# Patient Record
Sex: FEMALE | Race: WHITE | HISPANIC OR LATINO | URBAN - METROPOLITAN AREA
[De-identification: names, ages, dates, MRNs, and addresses within clinical notes are randomized per-mention and may not be internally consistent; named-entity substitution may affect disease eponyms.]

---

## 2017-12-10 ENCOUNTER — EMERGENCY (EMERGENCY)
Facility: HOSPITAL | Age: 67
LOS: 1 days | Discharge: HOME | End: 2017-12-10
Attending: EMERGENCY MEDICINE | Admitting: EMERGENCY MEDICINE
Payer: MEDICARE

## 2017-12-10 VITALS
DIASTOLIC BLOOD PRESSURE: 78 MMHG | TEMPERATURE: 99 F | HEART RATE: 73 BPM | SYSTOLIC BLOOD PRESSURE: 135 MMHG | OXYGEN SATURATION: 97 % | RESPIRATION RATE: 18 BRPM

## 2017-12-10 VITALS
SYSTOLIC BLOOD PRESSURE: 140 MMHG | OXYGEN SATURATION: 99 % | RESPIRATION RATE: 16 BRPM | DIASTOLIC BLOOD PRESSURE: 84 MMHG | HEART RATE: 62 BPM

## 2017-12-10 PROCEDURE — 70486 CT MAXILLOFACIAL W/O DYE: CPT

## 2017-12-10 PROCEDURE — 90715 TDAP VACCINE 7 YRS/> IM: CPT

## 2017-12-10 PROCEDURE — 99284 EMERGENCY DEPT VISIT MOD MDM: CPT | Mod: 25

## 2017-12-10 PROCEDURE — 12011 RPR F/E/E/N/L/M 2.5 CM/<: CPT

## 2017-12-10 PROCEDURE — 73562 X-RAY EXAM OF KNEE 3: CPT

## 2017-12-10 PROCEDURE — 90471 IMMUNIZATION ADMIN: CPT

## 2017-12-10 PROCEDURE — 70450 CT HEAD/BRAIN W/O DYE: CPT

## 2017-12-10 PROCEDURE — 70450 CT HEAD/BRAIN W/O DYE: CPT | Mod: 26

## 2017-12-10 PROCEDURE — 70486 CT MAXILLOFACIAL W/O DYE: CPT | Mod: 26

## 2017-12-10 PROCEDURE — 73562 X-RAY EXAM OF KNEE 3: CPT | Mod: 26,LT

## 2017-12-10 RX ORDER — CEPHALEXIN 500 MG
500 CAPSULE ORAL ONCE
Qty: 0 | Refills: 0 | Status: COMPLETED | OUTPATIENT
Start: 2017-12-10 | End: 2017-12-10

## 2017-12-10 RX ORDER — TETANUS TOXOID, REDUCED DIPHTHERIA TOXOID AND ACELLULAR PERTUSSIS VACCINE, ADSORBED 5; 2.5; 8; 8; 2.5 [IU]/.5ML; [IU]/.5ML; UG/.5ML; UG/.5ML; UG/.5ML
0.5 SUSPENSION INTRAMUSCULAR ONCE
Qty: 0 | Refills: 0 | Status: COMPLETED | OUTPATIENT
Start: 2017-12-10 | End: 2017-12-10

## 2017-12-10 RX ORDER — ACETAMINOPHEN 500 MG
650 TABLET ORAL ONCE
Qty: 0 | Refills: 0 | Status: COMPLETED | OUTPATIENT
Start: 2017-12-10 | End: 2017-12-10

## 2017-12-10 RX ORDER — CEPHALEXIN 500 MG
1 CAPSULE ORAL
Qty: 9 | Refills: 0
Start: 2017-12-10 | End: 2017-12-12

## 2017-12-10 RX ADMIN — Medication 650 MILLIGRAM(S): at 19:23

## 2017-12-10 RX ADMIN — TETANUS TOXOID, REDUCED DIPHTHERIA TOXOID AND ACELLULAR PERTUSSIS VACCINE, ADSORBED 0.5 MILLILITER(S): 5; 2.5; 8; 8; 2.5 SUSPENSION INTRAMUSCULAR at 16:26

## 2017-12-10 RX ADMIN — Medication 500 MILLIGRAM(S): at 19:23

## 2017-12-10 RX ADMIN — Medication 650 MILLIGRAM(S): at 16:23

## 2017-12-10 NOTE — ED PROVIDER NOTE - MOUTH
inner lower lip laceration - stellate 1, outer lower lip laceration  stellate,crossing the vermilion border

## 2017-12-10 NOTE — ED PROVIDER NOTE - OBJECTIVE STATEMENT
66 yo female accompanied by  in room 6 here for evaluation of facial injuries s/ trip and fall on black ice this am.  Pt states "I slipped outside this am and landed on my face. I cut my lip inside and out and broke my front tooth. My bite also seems off. My left knee is bruised". Tdap not utd. DEnies loc, denies neck and back pain.

## 2017-12-10 NOTE — ED PROCEDURE NOTE - ATTENDING CONTRIBUTION TO CARE
***Pawel Cullen MD*** signed out to Dr. Carroll upon shift change: Attending physician was available for the key components of the procedure, the patient tolerated well. There were no complications with the procedure.

## 2017-12-10 NOTE — ED PROVIDER NOTE - ATTENDING CONTRIBUTION TO CARE
Patient with trip and fall slipping on ice this am. Moderate pain to face secondary to fall. Patient cup her lower lip and felt as if she chipped her front tooth. Mild left knee pain from the fall as well without twisting or difficulty ambulating. No loss of consciousness or other head injury, no neck pain.   no midline cervical tenderness to palpation, CN 2-12 intact, normal coordination, normal finger to nose, normal heel to shin, no pronator drift, no gait instability, 5/5 strength in upper and lower extremities, normal sensory throughout, alert and oriented to person, place, time, and situation.   mild distress secondary to pain, stellate inner lower lip laceration ~3 cm, 2cm external lip laceration, trachea midline, cooperative, with capacity and insight, alert, perrl, eomi, left knee with mild tenderness to palpation, no laxity of joint  concern for facial fracture and will ct head/face, will suture wounds, offer TDAP, analgesia, and xray knee  xray knee within normal limits   no ich, no facial fractures, patient to  follow up with dental and informed of ct findings of molar with edema ? infection, will give antibiotics and discharge to pmd/dental  No immediate life threatening issues present on history or clinical exam. Patient is a safe disposition home, has capacity and insight into their condition, ambulatory in the emergency department and will follow up with their doctor(s) this week. Patient  understands anticipatory guidance and was given strict return and follow up precautions. The patient has been informed of all concerning signs and symptoms to return to Emergency Department, the necessity to follow up with PMD/Clinic/follow up provided within 2-3 days was explained, and the patient reports understanding of above with capacity and insight if patient disposition is to be home.

## 2017-12-10 NOTE — ED PROVIDER NOTE - CARE PLAN
Principal Discharge DX:	Lip laceration, initial encounter  Instructions for follow-up, activity and diet:	Return to the ER for any concerns  Return in 5- 6 days for suture removal  Eat soft foods- do not use straws  Take keflex 3x a day for 3 days  eat yogurt daily while taking antibiotic  Take  motrin for pain as needed 600 mg every8 hrs- take with food

## 2017-12-10 NOTE — ED ADULT NURSE NOTE - OBJECTIVE STATEMENT
Pt c/o trip and fall on ice today around 1230, fell forwards, blocking fall with L arm and hand, hitting mouth and teeth.  Lac to L side of lip and interior lip, front teeth chipped, pain to area where lacerations are present and radiates to sides of her face.  Denies loc.  Also c/o L hand and elbow pain, no lacs/ecchymosis/deformities to those areas, +full ROM.  Has not taken anything for pain.  Denies n/v, headache, vision changes, other symptoms.

## 2017-12-10 NOTE — ED PROVIDER NOTE - PLAN OF CARE
Return to the ER for any concerns  Return in 5- 6 days for suture removal  Eat soft foods- do not use straws  Take keflex 3x a day for 3 days  eat yogurt daily while taking antibiotic  Take  motrin for pain as needed 600 mg every8 hrs- take with food

## 2017-12-21 ENCOUNTER — TRANSCRIPTION ENCOUNTER (OUTPATIENT)
Age: 67
End: 2017-12-21

## 2019-05-13 ENCOUNTER — EMERGENCY (EMERGENCY)
Facility: HOSPITAL | Age: 69
LOS: 1 days | Discharge: HOME | End: 2019-05-13
Attending: EMERGENCY MEDICINE
Payer: MEDICARE

## 2019-05-13 VITALS
SYSTOLIC BLOOD PRESSURE: 113 MMHG | OXYGEN SATURATION: 100 % | RESPIRATION RATE: 18 BRPM | HEART RATE: 64 BPM | DIASTOLIC BLOOD PRESSURE: 78 MMHG

## 2019-05-13 VITALS
DIASTOLIC BLOOD PRESSURE: 73 MMHG | SYSTOLIC BLOOD PRESSURE: 149 MMHG | OXYGEN SATURATION: 99 % | HEIGHT: 63 IN | HEART RATE: 65 BPM | TEMPERATURE: 98 F | WEIGHT: 164.91 LBS | RESPIRATION RATE: 19 BRPM

## 2019-05-13 LAB
ALBUMIN SERPL ELPH-MCNC: 4 G/DL — SIGNIFICANT CHANGE UP (ref 3.3–5)
ALP SERPL-CCNC: 70 U/L — SIGNIFICANT CHANGE UP (ref 40–120)
ALT FLD-CCNC: 25 U/L — SIGNIFICANT CHANGE UP (ref 10–45)
ANION GAP SERPL CALC-SCNC: 13 MMOL/L — SIGNIFICANT CHANGE UP (ref 5–17)
APTT BLD: 31.3 SEC — SIGNIFICANT CHANGE UP (ref 27.5–36.3)
AST SERPL-CCNC: 27 U/L — SIGNIFICANT CHANGE UP (ref 10–40)
BASOPHILS # BLD AUTO: 0 K/UL — SIGNIFICANT CHANGE UP (ref 0–0.2)
BASOPHILS NFR BLD AUTO: 0.5 % — SIGNIFICANT CHANGE UP (ref 0–2)
BILIRUB SERPL-MCNC: 0.4 MG/DL — SIGNIFICANT CHANGE UP (ref 0.2–1.2)
BUN SERPL-MCNC: 14 MG/DL — SIGNIFICANT CHANGE UP (ref 7–23)
CALCIUM SERPL-MCNC: 10.2 MG/DL — SIGNIFICANT CHANGE UP (ref 8.4–10.5)
CHLORIDE SERPL-SCNC: 98 MMOL/L — SIGNIFICANT CHANGE UP (ref 96–108)
CO2 SERPL-SCNC: 23 MMOL/L — SIGNIFICANT CHANGE UP (ref 22–31)
CREAT SERPL-MCNC: 0.79 MG/DL — SIGNIFICANT CHANGE UP (ref 0.5–1.3)
EOSINOPHIL # BLD AUTO: 0.2 K/UL — SIGNIFICANT CHANGE UP (ref 0–0.5)
EOSINOPHIL NFR BLD AUTO: 2.5 % — SIGNIFICANT CHANGE UP (ref 0–6)
GLUCOSE SERPL-MCNC: 99 MG/DL — SIGNIFICANT CHANGE UP (ref 70–99)
HCT VFR BLD CALC: 40.6 % — SIGNIFICANT CHANGE UP (ref 34.5–45)
HGB BLD-MCNC: 14.3 G/DL — SIGNIFICANT CHANGE UP (ref 11.5–15.5)
INR BLD: 1.04 RATIO — SIGNIFICANT CHANGE UP (ref 0.88–1.16)
LIDOCAIN IGE QN: 46 U/L — SIGNIFICANT CHANGE UP (ref 7–60)
LYMPHOCYTES # BLD AUTO: 2.2 K/UL — SIGNIFICANT CHANGE UP (ref 1–3.3)
LYMPHOCYTES # BLD AUTO: 31.4 % — SIGNIFICANT CHANGE UP (ref 13–44)
MCHC RBC-ENTMCNC: 31.2 PG — SIGNIFICANT CHANGE UP (ref 27–34)
MCHC RBC-ENTMCNC: 35.2 GM/DL — SIGNIFICANT CHANGE UP (ref 32–36)
MCV RBC AUTO: 88.6 FL — SIGNIFICANT CHANGE UP (ref 80–100)
MONOCYTES # BLD AUTO: 0.5 K/UL — SIGNIFICANT CHANGE UP (ref 0–0.9)
MONOCYTES NFR BLD AUTO: 7 % — SIGNIFICANT CHANGE UP (ref 2–14)
NEUTROPHILS # BLD AUTO: 4.1 K/UL — SIGNIFICANT CHANGE UP (ref 1.8–7.4)
NEUTROPHILS NFR BLD AUTO: 58.7 % — SIGNIFICANT CHANGE UP (ref 43–77)
PLATELET # BLD AUTO: 213 K/UL — SIGNIFICANT CHANGE UP (ref 150–400)
POTASSIUM SERPL-MCNC: 3.9 MMOL/L — SIGNIFICANT CHANGE UP (ref 3.5–5.3)
POTASSIUM SERPL-SCNC: 3.9 MMOL/L — SIGNIFICANT CHANGE UP (ref 3.5–5.3)
PROT SERPL-MCNC: 6.9 G/DL — SIGNIFICANT CHANGE UP (ref 6–8.3)
PROTHROM AB SERPL-ACNC: 11.9 SEC — SIGNIFICANT CHANGE UP (ref 10–12.9)
RBC # BLD: 4.58 M/UL — SIGNIFICANT CHANGE UP (ref 3.8–5.2)
RBC # FLD: 12.3 % — SIGNIFICANT CHANGE UP (ref 10.3–14.5)
SODIUM SERPL-SCNC: 134 MMOL/L — LOW (ref 135–145)
TROPONIN T, HIGH SENSITIVITY RESULT: <6 NG/L — SIGNIFICANT CHANGE UP (ref 0–51)
WBC # BLD: 7 K/UL — SIGNIFICANT CHANGE UP (ref 3.8–10.5)
WBC # FLD AUTO: 7 K/UL — SIGNIFICANT CHANGE UP (ref 3.8–10.5)

## 2019-05-13 PROCEDURE — 99284 EMERGENCY DEPT VISIT MOD MDM: CPT | Mod: 25

## 2019-05-13 PROCEDURE — 96374 THER/PROPH/DIAG INJ IV PUSH: CPT

## 2019-05-13 PROCEDURE — 85027 COMPLETE CBC AUTOMATED: CPT

## 2019-05-13 PROCEDURE — 84484 ASSAY OF TROPONIN QUANT: CPT

## 2019-05-13 PROCEDURE — 71046 X-RAY EXAM CHEST 2 VIEWS: CPT | Mod: 26

## 2019-05-13 PROCEDURE — 85610 PROTHROMBIN TIME: CPT

## 2019-05-13 PROCEDURE — 83690 ASSAY OF LIPASE: CPT

## 2019-05-13 PROCEDURE — 85730 THROMBOPLASTIN TIME PARTIAL: CPT

## 2019-05-13 PROCEDURE — 71046 X-RAY EXAM CHEST 2 VIEWS: CPT

## 2019-05-13 PROCEDURE — 99284 EMERGENCY DEPT VISIT MOD MDM: CPT | Mod: GC

## 2019-05-13 PROCEDURE — 80053 COMPREHEN METABOLIC PANEL: CPT

## 2019-05-13 RX ORDER — FAMOTIDINE 10 MG/ML
20 INJECTION INTRAVENOUS ONCE
Refills: 0 | Status: COMPLETED | OUTPATIENT
Start: 2019-05-13 | End: 2019-05-13

## 2019-05-13 RX ADMIN — FAMOTIDINE 20 MILLIGRAM(S): 10 INJECTION INTRAVENOUS at 12:58

## 2019-05-13 RX ADMIN — Medication 30 MILLILITER(S): at 12:58

## 2019-05-13 NOTE — ED PROVIDER NOTE - ATTENDING CONTRIBUTION TO CARE
MD Carly -- I have reviewed this note, the presenting symptoms, and the Chief Complaint and the History of Present Illness as documented, with the other care provider(s) and nurses on the patient care team. I have also reviewed this patient's past medical/surgical history and social/family history as reviewed and listed in this electronic medical record and agree with the above except as noted below:     HPI -- 67 y F h/o HTN, remote h/o wpw s/p ablation 2000 being eval'd by cardiology for palps over the last few weeks (external home monitor), unknown event hx on tele; presents today because has 2 days sternal CP, band like across epigastrium, unsure if radiates, believes possibly to back.  Intermittent, episodic, not a/w exertion, no n/v associated.  Had episode of lightheadness/diaphoresis earlier this AM that was not a/w CP, which made her present for eval.  Nonsmoker, denies toxic habits, no cough, no sob, no f/c, no complaints otherwise.  States sister had a stroke approx 10 days ago (2/2 afib) and she is concerned re her own risk for afib; this has been causing her significant stress and anxiety.  Currently not on AC.  PMH -- wpw, Hypertension  PSH -- ablation for wpw 2000  Allergies -- Contrast dye (CT) -- anaphylaxis  ROS -- CP, diaphoresis  PE -- GENERAL: AAOx4, GCS 15, NAD but tearful, WDWN; HEENT: MMM, no jugular venous distension, supple neck, PERRLA, EOMI, nonicteric sclera; PULM: CTA B, no crackles/rubs/rales; CV: RRR, S1S2, no MRG; ABD: Flat abdomen, NTND, no R/G/R, no CVAT.  MSK: GAO, +2 pulses x4;  NEURO: No obvious focal deficits; PSYCH: AAOx3, clear thought and normal sensorium.   MDM -- Palps, CP, diaphoresis earlier today; on oupt tele for palps and feeling "fluttering" over last few days; CP/diaphoresis is more acute (yesterday into today).  Symptoms are band-like across epigastrium, minimal currently, no n/v, SOB.  Feels anxious, no exertional component.  Last stress approx 1 yr ago, echo 3 mon ago -- both WNL.  B/L UE pressures WNL, symptoms are not c/w PE or dissection.  Overall low suspicion for acute coronary syndrome but requires trops/CXR/EKG.  Will reassess after w/u.  Consider CDU v d/c assuming benign w/u.

## 2019-05-13 NOTE — ED PROVIDER NOTE - NSFOLLOWUPINSTRUCTIONS_ED_ALL_ED_FT
Follow up with Dr. Gomez's office at your appointment at 3 PM on WEDNESDAY.  Call the office to reschedule if you cannot make this appointment  Continue with your medications as prescribed  Return to the ER IMMEDIATELY for continued chest pain, vomiting, dizziness/lightheadedness, difficulty breathing, or ANY other concerns

## 2019-05-13 NOTE — ED PROVIDER NOTE - PROGRESS NOTE DETAILS
Carly -- s/w Dr. Gomez, patient's cardiologist.  Comfortable c d/c plan as stated, will be able to see patient on Wed at 3pm.  Long d/w pt re return precautions; patient is comfortable c d/c and feels better.  D/C.  --BMM

## 2019-05-13 NOTE — ED ADULT NURSE NOTE - OBJECTIVE STATEMENT
68 y/o female hx HTN, WPW s/p ablation presents to the ED from home c/o palpitations x few days. Pt states she feels palpitations periodically, nothing exacerbates or makes pain better, concerned due to pt sister recent stroke hx due to afib. Pain is "band like" radiates to back. Denies fever, chills, n/v, weakness, abd pain, diarrhea/constipation, numbness/tingling, urinary s/s, SOB, recent travel. Pt A&Ox3, in no respiratory distress, no CP, PT safety maintained with family at bedside, call bell within reach and bed in the lowest position.

## 2019-05-13 NOTE — ED ADULT NURSE NOTE - NSIMPLEMENTINTERV_GEN_ALL_ED
Implemented All Universal Safety Interventions:  [place] to call system. Call bell, personal items and telephone within reach. Instruct patient to call for assistance. Room bathroom lighting operational. Non-slip footwear when patient is off stretcher. Physically safe environment: no spills, clutter or unnecessary equipment. Stretcher in lowest position, wheels locked, appropriate side rails in place.

## 2019-05-14 PROBLEM — F32.9 MAJOR DEPRESSIVE DISORDER, SINGLE EPISODE, UNSPECIFIED: Chronic | Status: ACTIVE | Noted: 2017-12-10

## 2019-05-14 PROBLEM — I10 ESSENTIAL (PRIMARY) HYPERTENSION: Chronic | Status: ACTIVE | Noted: 2017-12-10

## 2019-05-14 PROBLEM — K21.9 GASTRO-ESOPHAGEAL REFLUX DISEASE WITHOUT ESOPHAGITIS: Chronic | Status: ACTIVE | Noted: 2017-12-10

## 2020-02-26 ENCOUNTER — APPOINTMENT (OUTPATIENT)
Dept: ELECTROPHYSIOLOGY | Facility: CLINIC | Age: 70
End: 2020-02-26
Payer: MEDICARE

## 2020-02-26 ENCOUNTER — TRANSCRIPTION ENCOUNTER (OUTPATIENT)
Age: 70
End: 2020-02-26

## 2020-02-26 VITALS
HEART RATE: 65 BPM | SYSTOLIC BLOOD PRESSURE: 178 MMHG | BODY MASS INDEX: 26.91 KG/M2 | HEIGHT: 62.5 IN | OXYGEN SATURATION: 97 % | DIASTOLIC BLOOD PRESSURE: 84 MMHG | WEIGHT: 150 LBS | RESPIRATION RATE: 17 BRPM

## 2020-02-26 DIAGNOSIS — I45.6 PRE-EXCITATION SYNDROME: ICD-10-CM

## 2020-02-26 DIAGNOSIS — Z72.89 OTHER PROBLEMS RELATED TO LIFESTYLE: ICD-10-CM

## 2020-02-26 DIAGNOSIS — Z83.438 FAMILY HISTORY OF OTHER DISORDER OF LIPOPROTEIN METABOLISM AND OTHER LIPIDEMIA: ICD-10-CM

## 2020-02-26 DIAGNOSIS — I10 ESSENTIAL (PRIMARY) HYPERTENSION: ICD-10-CM

## 2020-02-26 DIAGNOSIS — Z82.49 FAMILY HISTORY OF ISCHEMIC HEART DISEASE AND OTHER DISEASES OF THE CIRCULATORY SYSTEM: ICD-10-CM

## 2020-02-26 DIAGNOSIS — R00.2 PALPITATIONS: ICD-10-CM

## 2020-02-26 DIAGNOSIS — Z78.9 OTHER SPECIFIED HEALTH STATUS: ICD-10-CM

## 2020-02-26 PROCEDURE — 99204 OFFICE O/P NEW MOD 45 MIN: CPT

## 2020-02-26 RX ORDER — APIXABAN 5 MG/1
5 TABLET, FILM COATED ORAL
Qty: 180 | Refills: 3 | Status: ACTIVE | COMMUNITY

## 2020-02-26 RX ORDER — OMEGA-3-ACID ETHYL ESTERS 1 G/1
1 CAPSULE, LIQUID FILLED ORAL TWICE DAILY
Qty: 180 | Refills: 3 | Status: ACTIVE | COMMUNITY

## 2020-02-26 RX ORDER — CYCLOSPORINE 0.5 MG/ML
0.05 EMULSION OPHTHALMIC
Refills: 0 | Status: ACTIVE | COMMUNITY

## 2020-02-26 RX ORDER — VITAMIN E (DL,TOCOPHERYL ACET) 180 MG
500 CAPSULE ORAL
Refills: 0 | Status: ACTIVE | COMMUNITY

## 2020-02-26 NOTE — REVIEW OF SYSTEMS
[Recent Weight Loss (___ Lbs)] : recent [unfilled] ~Ulb weight loss [Eyeglasses] : currently wearing eyeglasses [see HPI] : see HPI [Palpitations] : palpitations [Negative] : Heme/Lymph

## 2020-02-26 NOTE — REASON FOR VISIT
[Initial Evaluation] : an initial evaluation of [Spouse] : spouse [Atrial Fibrillation] : atrial fibrillation

## 2020-02-26 NOTE — PHYSICAL EXAM
[General Appearance - In No Acute Distress] : no acute distress [General Appearance - Well Developed] : well developed [Normal Conjunctiva] : the conjunctiva exhibited no abnormalities [No Oral Cyanosis] : no oral cyanosis [Eyelids - No Xanthelasma] : the eyelids demonstrated no xanthelasmas [No Oral Pallor] : no oral pallor [Normal Jugular Venous V Waves Present] : normal jugular venous V waves present [Heart Rate And Rhythm] : heart rate and rhythm were normal [Heart Sounds] : normal S1 and S2 [Murmurs] : no murmurs present [Arterial Pulses Normal] : the arterial pulses were normal [Respiration, Rhythm And Depth] : normal respiratory rhythm and effort [Edema] : no peripheral edema present [Auscultation Breath Sounds / Voice Sounds] : lungs were clear to auscultation bilaterally [Abdomen Soft] : soft [Abdomen Tenderness] : non-tender [Gait - Sufficient For Exercise Testing] : the gait was sufficient for exercise testing [Abnormal Walk] : normal gait [Nail Clubbing] : no clubbing of the fingernails [] : no rash [Cyanosis, Localized] : no localized cyanosis [No Anxiety] : not feeling anxious

## 2020-02-27 RX ORDER — DRONEDARONE 400 MG/1
400 TABLET, FILM COATED ORAL
Qty: 180 | Refills: 3 | Status: DISCONTINUED | COMMUNITY
End: 2020-02-27

## 2020-03-04 ENCOUNTER — NON-APPOINTMENT (OUTPATIENT)
Age: 70
End: 2020-03-04

## 2020-03-04 ENCOUNTER — APPOINTMENT (OUTPATIENT)
Dept: ELECTROPHYSIOLOGY | Facility: CLINIC | Age: 70
End: 2020-03-04
Payer: MEDICARE

## 2020-03-04 VITALS
OXYGEN SATURATION: 98 % | BODY MASS INDEX: 26.58 KG/M2 | DIASTOLIC BLOOD PRESSURE: 78 MMHG | HEART RATE: 59 BPM | HEIGHT: 63 IN | SYSTOLIC BLOOD PRESSURE: 130 MMHG | WEIGHT: 150 LBS

## 2020-03-04 DIAGNOSIS — Z86.79 PERSONAL HISTORY OF OTHER DISEASES OF THE CIRCULATORY SYSTEM: ICD-10-CM

## 2020-03-04 DIAGNOSIS — E78.00 PURE HYPERCHOLESTEROLEMIA, UNSPECIFIED: ICD-10-CM

## 2020-03-04 PROCEDURE — 99213 OFFICE O/P EST LOW 20 MIN: CPT

## 2020-03-04 NOTE — HISTORY OF PRESENT ILLNESS
[FreeTextEntry1] : Patient is a 69-year-old woman who is known to me in the past who is seen today in evaluation for paroxysmal atrial fibrillation.  She was accompanied by her .\par \par In terms of her past medical history we had done a WPW ablation on her May 30, 1996 at [facility] in [facility].  At that time she had had delta waves there were consistent with a left sided pathway.  The pathway was mapped to the left lateral position and was successfully ablated.  Since then she has not had any recurrence of the SVT.\par \par She has a history of hypertension for which she is treated with Toprol- mg/day she is also on hydrochlorothiazide 25 mg/day.\par \par There is no prior history of diabetes, TIA or prior stroke.  She has no known prior thyroid disease.\par \par No previous history of CAD or prior myocardial infarction or heart failure.\par \par The patient does have a prior history of sleep apnea and was given a mask but she slept worse with a mask and was returned.  Of note she has lost weight since that sleep study.\par \par A. fib history: About 2 years ago the patient had nonspecific complaints of feeling fluttering or palpitations and rapid beats.  She subsequently had a monitor when was the monitor she had a Holter monitor 24 hours performed April 2019 that showed sinus rhythm, APCs, PVCs, short episodes of rate controlled A. fib.  She then had a 2-week event monitor.  Did not have copy of all of the event monitor strips but according to the patient there was one episode of A. fib during event monitoring.  A strip from May 18 2-0 19 at 2 AM had showed atrial flutter with variable block.  Between April 2019 and February of this year she continue to have frequent episodes that were short in duration.  These episodes were short in duration to the point where she could not catch them on her Apple Watch.  She continued to have episodes initially lasting very short duration less than 5 minutes but then became longer as much is 30 minutes.  The episodes occur randomly without any clear triggers.  I reviewed some of the episodes on her record is from the apple watch clearly shows A. fib with rates on average between . \par \par The patient was previously started on Eliquis because of her high chadsvasc score based on female gender, age greater than 65 and history of hypertension.  After her episodes became more frequent and last longer she was also started on Multaq 400 mg twice daily.  Of note she was already on Toprol- mg/day.  According to the patient the Multaq has not really worked and may have made some episodes even worse.\par \par She had additional testing which included an echocardiogram that showed estimated ejection fraction 56%, left atrial diameter 4.1 cm mild left ventricular hypertrophy mild left atrial enlargement mild mild mitral and tricuspid regurg mild pulmonary hypertension with estimated pressure 41 mmHg.  She had a stress echo.  Currently the stress test showed she had no evidence of ischemia but she had exercise-induced rapid A. fib.\par \par Family history: Her mother has permanent atrial fibrillation.\par \par The patient was initially treated with Toprol more for history of hypertension.

## 2020-03-04 NOTE — ADDENDUM
[FreeTextEntry1] : I, Allie Kimball, acted solely as a scribe for Dr. Quinonez on this date 02/26/2020.

## 2020-03-04 NOTE — END OF VISIT
[FreeTextEntry3] : All medical records entries made by the Scribe were at my, Dr. Vikram Quinonez MD, direction and personally dictated by me on 02/26/2020. I have personally reviewed the chart and agree that the record accurately reflects my personal performance of the history, physical exam, assessment, and plan. I have also personally directed, reviewed, and agreed with the chart.\par \par

## 2020-03-04 NOTE — DISCUSSION/SUMMARY
[FreeTextEntry1] : Patient will discontinue her Multaq as it is not helping her A fib at this time. Her A fib could be age related or brought on by sleep apnea. Patients mother did have A fib as well. She will begin on the flecainide two days after she stops her Multaq. She will remain on metoprolol at this time. \par \par She will follow up with me on whether or not she wants to continue with medication therapy or if she wants to go ahead with an ablation. Patient has been made aware of all risks concerning the ablation procedure. Patient will follow up after consultation with her .

## 2020-03-17 PROBLEM — E78.00 HYPERCHOLESTEROLEMIA: Status: ACTIVE | Noted: 2020-02-26

## 2020-03-17 NOTE — DISCUSSION/SUMMARY
[FreeTextEntry1] : The patient seems to be doing well on flecainide.  We will continue her on  Flecainide 100mg in the morning and 50mg at night.  We will continue anticoagulation for now.  Her blood pressure is controlled.  She will continue monitoring through her Apple Watch.  If recurrent episodes of atrial fibrillation we will consider catheter ablation.  Patient does not want to have the procedure at this time.  \par \par Follow-up evaluation in 4 months.

## 2020-03-17 NOTE — PHYSICAL EXAM
[General Appearance - Well Developed] : well developed [General Appearance - In No Acute Distress] : no acute distress [Normal Conjunctiva] : the conjunctiva exhibited no abnormalities [Eyelids - No Xanthelasma] : the eyelids demonstrated no xanthelasmas [No Oral Pallor] : no oral pallor [No Oral Cyanosis] : no oral cyanosis [Normal Jugular Venous V Waves Present] : normal jugular venous V waves present [Respiration, Rhythm And Depth] : normal respiratory rhythm and effort [Auscultation Breath Sounds / Voice Sounds] : lungs were clear to auscultation bilaterally [Heart Rate And Rhythm] : heart rate and rhythm were normal [Heart Sounds] : normal S1 and S2 [Murmurs] : no murmurs present [Arterial Pulses Normal] : the arterial pulses were normal [Edema] : no peripheral edema present [Abdomen Soft] : soft [Abdomen Tenderness] : non-tender [Nail Clubbing] : no clubbing of the fingernails [Cyanosis, Localized] : no localized cyanosis [] : no rash [Impaired Insight] : insight and judgment were intact

## 2020-03-17 NOTE — ADDENDUM
[FreeTextEntry1] : I, Allie Kimball, acted solely as a scribe for Dr. Quinonez on this date 03/04/2020.

## 2020-03-17 NOTE — END OF VISIT
[FreeTextEntry3] : All medical records entries made by the Scribe were at my, Dr. Vikram Quinonez MD, direction and personally dictated by me on 03/04/2020. I have personally reviewed the chart and agree that the record accurately reflects my personal performance of the history, physical exam, assessment, and plan. I have also personally directed, reviewed, and agreed with the chart.\par \par

## 2020-03-17 NOTE — HISTORY OF PRESENT ILLNESS
[FreeTextEntry1] : She is seen in follow-up regarding her atrial fibrillation.  On the last visit she was started on low-dose flecainide.  This dosage was increased to 100 mg twice daily.  Understands that she is feeling well with very infrequent very short palpitation.  She has no other symptoms.\par \par She has a history of hypertension for which she is treated with Toprol- mg/day she is also on hydrochlorothiazide 25 mg/day.\par \par There is no prior history of diabetes, TIA or prior stroke.  She has no known prior thyroid disease.\par \par No previous history of CAD or prior myocardial infarction or heart failure.\par \par The patient does have a prior history of sleep apnea and was given a mask but she slept worse with a mask and was returned.  Of note she has lost weight since that sleep study.\par \par The patient was previously started on Eliquis because of her high chadsvasc score based on female gender, age greater than 65 and history of hypertension. \par \par Echocardiogram showed estimated ejection fraction 56%, left atrial diameter 4.1 cm mild left ventricular hypertrophy mild left atrial enlargement mild mild mitral and tricuspid regurg mild pulmonary hypertension with estimated pressure 41 mmHg. Stress echo showed no evidence of ischemia \par Family history: Her mother has permanent atrial fibrillation.\par \par

## 2020-09-28 ENCOUNTER — NON-APPOINTMENT (OUTPATIENT)
Age: 70
End: 2020-09-28

## 2020-09-28 ENCOUNTER — APPOINTMENT (OUTPATIENT)
Dept: ELECTROPHYSIOLOGY | Facility: CLINIC | Age: 70
End: 2020-09-28
Payer: MEDICARE

## 2020-09-28 VITALS
DIASTOLIC BLOOD PRESSURE: 84 MMHG | OXYGEN SATURATION: 96 % | SYSTOLIC BLOOD PRESSURE: 140 MMHG | WEIGHT: 150 LBS | BODY MASS INDEX: 26.58 KG/M2 | HEART RATE: 54 BPM | HEIGHT: 63 IN | TEMPERATURE: 97.8 F

## 2020-09-28 PROCEDURE — 93000 ELECTROCARDIOGRAM COMPLETE: CPT

## 2020-09-28 PROCEDURE — 99213 OFFICE O/P EST LOW 20 MIN: CPT

## 2021-07-05 ENCOUNTER — APPOINTMENT (OUTPATIENT)
Dept: MRI IMAGING | Facility: CLINIC | Age: 71
End: 2021-07-05

## 2021-07-06 ENCOUNTER — OUTPATIENT (OUTPATIENT)
Dept: OUTPATIENT SERVICES | Facility: HOSPITAL | Age: 71
LOS: 1 days | End: 2021-07-06
Payer: MEDICARE

## 2021-07-06 ENCOUNTER — APPOINTMENT (OUTPATIENT)
Dept: MRI IMAGING | Facility: IMAGING CENTER | Age: 71
End: 2021-07-06
Payer: MEDICARE

## 2021-07-06 DIAGNOSIS — Z00.8 ENCOUNTER FOR OTHER GENERAL EXAMINATION: ICD-10-CM

## 2021-07-06 PROCEDURE — 70551 MRI BRAIN STEM W/O DYE: CPT | Mod: 26,MH

## 2021-07-06 PROCEDURE — 70551 MRI BRAIN STEM W/O DYE: CPT

## 2021-07-07 ENCOUNTER — APPOINTMENT (OUTPATIENT)
Dept: MRI IMAGING | Facility: CLINIC | Age: 71
End: 2021-07-07

## 2021-07-14 ENCOUNTER — APPOINTMENT (OUTPATIENT)
Dept: ELECTROPHYSIOLOGY | Facility: CLINIC | Age: 71
End: 2021-07-14
Payer: MEDICARE

## 2021-07-14 ENCOUNTER — NON-APPOINTMENT (OUTPATIENT)
Age: 71
End: 2021-07-14

## 2021-07-14 VITALS
WEIGHT: 177 LBS | BODY MASS INDEX: 31.36 KG/M2 | HEIGHT: 63 IN | TEMPERATURE: 97.3 F | SYSTOLIC BLOOD PRESSURE: 160 MMHG | HEART RATE: 57 BPM | DIASTOLIC BLOOD PRESSURE: 88 MMHG | OXYGEN SATURATION: 100 %

## 2021-07-14 PROCEDURE — 93000 ELECTROCARDIOGRAM COMPLETE: CPT

## 2021-07-14 PROCEDURE — 99214 OFFICE O/P EST MOD 30 MIN: CPT

## 2021-07-14 NOTE — DISCUSSION/SUMMARY
[FreeTextEntry1] : Patient is doing well overall without any recurrence of A. fib.  She has infrequent PVCs and we discussed the options and the decision was to continue on medical therapy and if the burden should become higher or more symptomatic we would then we discussed catheter ablation.\par We will continue her on flecainide 100 mg twice daily which is considered a low average dose.  We discussed Eliquis and because of her prior A. fib I would recommend that we continue.  In future we may consider an implantable loop monitor and and may decide to hold Eliquis if we have the alerts for A. fib.  We will continue her on the current dose of Toprol- mg a day as well.\par \par The patient will follow-up with her primary care physician as well as her cardiologist and will see me in follow-up in approximately a year.  If she were to have symptoms before then then I would see her based on symptoms.\par The patient seems to be doing well on flecainide.  We will continue her on  Flecainide 100mg in the morning and 50mg at night.  We will continue anticoagulation for now.  Her blood pressure is controlled.  She will continue monitoring through her Apple Watch.  If recurrent episodes of atrial fibrillation we will consider catheter ablation.  Patient does not want to have the procedure at this time.  \par \par Follow-up evaluation in 4 months.

## 2021-07-14 NOTE — REVIEW OF SYSTEMS
[Feeling Fatigued] : feeling fatigued [Dizziness] : dizziness [Under Stress] : under stress [Fever] : no fever [Blurry Vision] : no blurred vision [Sore Throat] : no sore throat [Dyspnea on exertion] : not dyspnea during exertion [Chest Discomfort] : no chest discomfort [Cough] : no cough [Abdominal Pain] : no abdominal pain [Dysuria] : no dysuria [Easy Bleeding] : no tendency for easy bleeding

## 2021-07-14 NOTE — HISTORY OF PRESENT ILLNESS
[FreeTextEntry1] : The patient is a 71-year-old woman who is seen in follow-up evaluation for atrial fibrillation.  She has been on flecainide 100 mg twice daily and Toprol- mg a day.  In addition the patient is on a diuretic hydrochlorothiazide 25 mg/day.\par \par She denies having any recurrence of atrial fibrillation but at times through her apple watch she feels her heart rate dips down to 40.  She is not able to check her heart rates at night because she prefer not to wear to watch at night.  She has had symptoms of vertigo at times.  She also gets symptoms of dizziness and lightheadedness but no chest pain or shortness of breath.  She has a cold sensation in her chest at times.\par \par She does not do much activities.\par \par The patient seen neurologist and had a few test done including MRI and EEG an VEG.\par \par She is seen in follow-up regarding her atrial fibrillation.  On the last visit she was started on low-dose flecainide.  This dosage was increased to 100 mg twice daily.  Understands that she is feeling well with very infrequent very short palpitation.  She has no other symptoms.\par \par She has a history of hypertension for which she is treated with Toprol- mg/day she is also on hydrochlorothiazide 25 mg/day.\par \par There is no prior history of diabetes, TIA or prior stroke.  She has no known prior thyroid disease.\par \par No previous history of CAD or prior myocardial infarction or heart failure.\par \par The patient does have a prior history of sleep apnea and was given a mask but she slept worse with a mask and was returned.  Of note she has lost weight since that sleep study.\par \par The patient was previously started on Eliquis because of her high chadsvasc score based on female gender, age greater than 65 and history of hypertension. \par \par Echocardiogram showed estimated ejection fraction 56%, left atrial diameter 4.1 cm mild left ventricular hypertrophy mild left atrial enlargement mild mild mitral and tricuspid regurg mild pulmonary hypertension with estimated pressure 41 mmHg. Stress echo showed no evidence of ischemia \par Family history: Her mother has permanent atrial fibrillation.\par \par

## 2021-07-14 NOTE — CARDIOLOGY SUMMARY
[de-identified] : Sinus  Bradycardia 58 bpm\par Left atrial enlargement.\par \par  ms\par \par QRS duration 90 ms\par \par QT interval 420 ms\par

## 2021-07-14 NOTE — HISTORY OF PRESENT ILLNESS
[FreeTextEntry1] : \par \par Patient is a 70-year woman who is seen in follow-up evaluation today.  She was previously seen by me March 4, 2020 at which time she was doing well.  Since then she has had a stress test that was done on June 17, 2020 she exercise achieved 94% of her predicted heart rate and no exercise at this arrhythmia was noted.  She has not had any recurrence of A. fib.  She has had isolated PVCs which she feels and sometimes bothersome but the frequency is low.  She is seen in follow-up regarding her atrial fibrillation.  On the last visit she was started on low-dose flecainide.  This dosage was increased to 100 mg twice daily.  Understands that she is feeling well with very infrequent very short palpitation.  She has no other symptoms.\par \par She has a history of hypertension for which she is treated with Toprol- mg/day she is also on hydrochlorothiazide 25 mg/day.\par \par There is no prior history of diabetes, TIA or prior stroke.  She has no known prior thyroid disease.\par \par No previous history of CAD or prior myocardial infarction or heart failure.\par \par The patient does have a prior history of sleep apnea and was given a mask but she slept worse with a mask and was returned.  Of note she has lost weight since that sleep study.\par \par The patient was previously started on Eliquis because of her high chadsvasc score based on female gender, age greater than 65 and history of hypertension. \par \par Echocardiogram showed estimated ejection fraction 56%, left atrial diameter 4.1 cm mild left ventricular hypertrophy mild left atrial enlargement mild mild mitral and tricuspid regurg mild pulmonary hypertension with estimated pressure 41 mmHg. Stress echo showed no evidence of ischemia \par Family history: Her mother has permanent atrial fibrillation.\par \par

## 2021-07-14 NOTE — DISCUSSION/SUMMARY
[FreeTextEntry1] : Patient has had some symptoms of dizziness and lightheadedness and question whether her vertigo is heart rate related as well.  Her blood pressure still elevated at least in the office at 160/88.\par \par My recommendation would be to decrease the dose of her beta-blocker given her symptoms of dizziness lightheadedness and bradycardia.  She is currently on Toprol- mg a day and would recommend decreasing to  50 mg/day.  She will need better control of her blood pressure and I would recommend an ACE inhibitor.\par \par We will also recommend more exertional activities diet and weight loss.  Even though she is using apple watch we might consider recommending an implantable loop monitor if there are difficulties with her rhythm analysis.\par \par I have discussed her blood pressure situation with the patient.  She seems to think that because of her acute stress recently with her mother her blood pressure is high she would prefer not to take any additional blood pressure medications point.  The patient will monitor this and then we will just read discuss over the next month.  She is to follow-up with a neurologist regarding her other symptoms.\par \par I would like her to follow-up in 4 months to reassess her.\par \par Follow-up evaluation in 4 months.

## 2021-07-14 NOTE — REASON FOR VISIT
[Hypertension] : hypertension [Atrial Fibrillation] : atrial fibrillation [Spouse] : spouse [FreeTextEntry2] : Follow up

## 2021-07-28 ENCOUNTER — EMERGENCY (EMERGENCY)
Facility: HOSPITAL | Age: 71
LOS: 1 days | Discharge: HOME | End: 2021-07-28
Attending: EMERGENCY MEDICINE
Payer: MEDICARE

## 2021-07-28 VITALS
RESPIRATION RATE: 20 BRPM | HEART RATE: 168 BPM | WEIGHT: 175.93 LBS | OXYGEN SATURATION: 100 % | SYSTOLIC BLOOD PRESSURE: 136 MMHG | TEMPERATURE: 98 F | HEIGHT: 63 IN | DIASTOLIC BLOOD PRESSURE: 77 MMHG

## 2021-07-28 DIAGNOSIS — Z90.710 ACQUIRED ABSENCE OF BOTH CERVIX AND UTERUS: Chronic | ICD-10-CM

## 2021-07-28 LAB
ALBUMIN SERPL ELPH-MCNC: 4.3 G/DL — SIGNIFICANT CHANGE UP (ref 3.3–5)
ALP SERPL-CCNC: 93 U/L — SIGNIFICANT CHANGE UP (ref 40–120)
ALT FLD-CCNC: 27 U/L — SIGNIFICANT CHANGE UP (ref 10–45)
ANION GAP SERPL CALC-SCNC: 16 MMOL/L — SIGNIFICANT CHANGE UP (ref 5–17)
AST SERPL-CCNC: 31 U/L — SIGNIFICANT CHANGE UP (ref 10–40)
BASOPHILS # BLD AUTO: 0.05 K/UL — SIGNIFICANT CHANGE UP (ref 0–0.2)
BASOPHILS NFR BLD AUTO: 0.6 % — SIGNIFICANT CHANGE UP (ref 0–2)
BILIRUB SERPL-MCNC: 0.3 MG/DL — SIGNIFICANT CHANGE UP (ref 0.2–1.2)
BUN SERPL-MCNC: 19 MG/DL — SIGNIFICANT CHANGE UP (ref 7–23)
CALCIUM SERPL-MCNC: 10.2 MG/DL — SIGNIFICANT CHANGE UP (ref 8.4–10.5)
CHLORIDE SERPL-SCNC: 99 MMOL/L — SIGNIFICANT CHANGE UP (ref 96–108)
CK MB CFR SERPL CALC: 3.2 NG/ML — SIGNIFICANT CHANGE UP (ref 0–3.8)
CO2 SERPL-SCNC: 21 MMOL/L — LOW (ref 22–31)
CREAT SERPL-MCNC: 0.96 MG/DL — SIGNIFICANT CHANGE UP (ref 0.5–1.3)
EOSINOPHIL # BLD AUTO: 0.18 K/UL — SIGNIFICANT CHANGE UP (ref 0–0.5)
EOSINOPHIL NFR BLD AUTO: 2.2 % — SIGNIFICANT CHANGE UP (ref 0–6)
GLUCOSE SERPL-MCNC: 163 MG/DL — HIGH (ref 70–99)
HCT VFR BLD CALC: 46.2 % — HIGH (ref 34.5–45)
HGB BLD-MCNC: 14.7 G/DL — SIGNIFICANT CHANGE UP (ref 11.5–15.5)
IMM GRANULOCYTES NFR BLD AUTO: 0.2 % — SIGNIFICANT CHANGE UP (ref 0–1.5)
LYMPHOCYTES # BLD AUTO: 2.62 K/UL — SIGNIFICANT CHANGE UP (ref 1–3.3)
LYMPHOCYTES # BLD AUTO: 31.8 % — SIGNIFICANT CHANGE UP (ref 13–44)
MAGNESIUM SERPL-MCNC: 1.9 MG/DL — SIGNIFICANT CHANGE UP (ref 1.6–2.6)
MCHC RBC-ENTMCNC: 28.9 PG — SIGNIFICANT CHANGE UP (ref 27–34)
MCHC RBC-ENTMCNC: 31.8 GM/DL — LOW (ref 32–36)
MCV RBC AUTO: 90.9 FL — SIGNIFICANT CHANGE UP (ref 80–100)
MONOCYTES # BLD AUTO: 0.58 K/UL — SIGNIFICANT CHANGE UP (ref 0–0.9)
MONOCYTES NFR BLD AUTO: 7 % — SIGNIFICANT CHANGE UP (ref 2–14)
NEUTROPHILS # BLD AUTO: 4.8 K/UL — SIGNIFICANT CHANGE UP (ref 1.8–7.4)
NEUTROPHILS NFR BLD AUTO: 58.2 % — SIGNIFICANT CHANGE UP (ref 43–77)
NRBC # BLD: 0 /100 WBCS — SIGNIFICANT CHANGE UP (ref 0–0)
PHOSPHATE SERPL-MCNC: 2.6 MG/DL — SIGNIFICANT CHANGE UP (ref 2.5–4.5)
PLATELET # BLD AUTO: 231 K/UL — SIGNIFICANT CHANGE UP (ref 150–400)
POTASSIUM SERPL-MCNC: 3.6 MMOL/L — SIGNIFICANT CHANGE UP (ref 3.5–5.3)
POTASSIUM SERPL-SCNC: 3.6 MMOL/L — SIGNIFICANT CHANGE UP (ref 3.5–5.3)
PROT SERPL-MCNC: 7.6 G/DL — SIGNIFICANT CHANGE UP (ref 6–8.3)
RBC # BLD: 5.08 M/UL — SIGNIFICANT CHANGE UP (ref 3.8–5.2)
RBC # FLD: 13.2 % — SIGNIFICANT CHANGE UP (ref 10.3–14.5)
SODIUM SERPL-SCNC: 136 MMOL/L — SIGNIFICANT CHANGE UP (ref 135–145)
TROPONIN T, HIGH SENSITIVITY RESULT: 40 NG/L — SIGNIFICANT CHANGE UP (ref 0–51)
WBC # BLD: 8.25 K/UL — SIGNIFICANT CHANGE UP (ref 3.8–10.5)
WBC # FLD AUTO: 8.25 K/UL — SIGNIFICANT CHANGE UP (ref 3.8–10.5)

## 2021-07-28 PROCEDURE — 99220: CPT | Mod: GC

## 2021-07-28 PROCEDURE — 71045 X-RAY EXAM CHEST 1 VIEW: CPT | Mod: 26

## 2021-07-28 RX ORDER — METOPROLOL TARTRATE 50 MG
10 TABLET ORAL ONCE
Refills: 0 | Status: COMPLETED | OUTPATIENT
Start: 2021-07-28 | End: 2021-07-28

## 2021-07-28 RX ORDER — RANITIDINE HYDROCHLORIDE 150 MG/1
0 TABLET, FILM COATED ORAL
Qty: 0 | Refills: 0 | DISCHARGE

## 2021-07-28 RX ADMIN — Medication 10 MILLIGRAM(S): at 19:50

## 2021-07-28 NOTE — ED CDU PROVIDER INITIAL DAY NOTE - PHYSICAL EXAMINATION
GEN: Well Appearing, Nontoxic, NAD  HEENT: NC/AT, Symm Facies.   CV: No JVD/Bruits or stridor;  +S1S2, RRR w/o m/g/r  RESP: CTAB w/o w/r/r  ABD: Soft, nt/nd  EXT/MSK: No lower extremity edema or calf tenderness. FROMx4  PSYCH: Appropriate mood and affect   Neuro: Grossly intact, AOX3 with normal speech, steady gait

## 2021-07-28 NOTE — ED PROVIDER NOTE - OBJECTIVE STATEMENT
72yo F Hx of HTN, depression WPW s/p ablation 1993, afib diagnosed 2 years ago presenting with complaints of palpitations. reports that she occasionally goes in and out of afib and it usually breaks on its own. this afternoon started having palpitations with intermittent sob and noted with her pulse ox that her HR  was going as fast as 200. palpitations persisted prompting pt to come to the ED. takes metoprolol 100mg which she took this AM and is also on flecainamide. takes eliquis but has missed the past 2 doses because she has a dental appt on friday. denies any lightheadedness, dizziness, chest pain, abd pain, nausea or vomiting. EP is Dr. Quinonez.

## 2021-07-28 NOTE — ED CDU PROVIDER DISPOSITION NOTE - CLINICAL COURSE
72 y/o F with PMH WPW s/p remote ablation, Afib on Eliquis and Flecainide, GERD, Depression, HTN presents to ED with irregular heart beat starting today. States that after eating dinner she felt her heart pounding and on her apple watch saw her heart rate was in the 190s-230s bpm. Symptoms associated with SOB. States that she called EP and was sent to voicemail. Patient has been taking Eliquis for Afib, has been holding it as per her cardiologist. States that she has a appointment with her dentist and was told 3 days prior to stop taking her Eliquis prior to any dental work. Took all of her medications for today. Denies current chest pain, SOB, fever, cough, visual changes, vomiting, abdominal pain.  	EP: Vikram Quinonez [phone]  	Cardiology: Jason [phone]  ED course: HR 216bpm, Afib with RVR, given IV Metoprolol, heart rate decreased to the 70s NSR. Plan for tele monitoring and EP consult in CDU. 70 y/o F with PMH WPW s/p remote ablation, Afib on Eliquis and Flecainide, GERD, Depression, HTN presents to ED with irregular heart beat starting today. States that after eating dinner she felt her heart pounding and on her apple watch saw her heart rate was in the 190s-230s bpm. Symptoms associated with SOB. States that she called EP and was sent to voicemail. Patient has been taking Eliquis for Afib, has been holding it as per her cardiologist. States that she has a appointment with her dentist and was told 3 days prior to stop taking her Eliquis prior to any dental work. Took all of her medications for today. Denies current chest pain, SOB, fever, cough, visual changes, vomiting, abdominal pain.  	EP: Vikram Morales [phone]  	Cardiology: Jason [phone]  ED course: HR 216bpm, Afib with RVR, given IV Metoprolol, heart rate decreased to the 70s NSR. Plan for tele monitoring and EP consult in CDU. Patient remained stable while in CDU with no events over tele. Seen by EP attending Dr. Morales who recommended to continue home medications as prescribed. Seen by Dr. Gauthier who cleared pt for discharge home

## 2021-07-28 NOTE — ED CDU PROVIDER INITIAL DAY NOTE - OBJECTIVE STATEMENT
72 y/o F with PMH WPW s/p remote ablation, Afib on Eliquis and Flecainide, GERD, Depression, HTN presents to ED with irregular heart beat starting today. States that after eating dinner she felt her heart pounding and on her apple watch saw her heart rate was in the 190s-230s bpm. Symptoms associated with SOB. States that she called EP and was sent to voicemail. Patient has been taking Eliquis for Afib, has been holding it as per her cardiologist. States that she has a appointment with her dentist and was told 3 days prior to stop taking her Eliquis prior to any dental work. Took all of her medications for today. Denies current chest pain, SOB, fever, cough, visual changes, vomiting, abdominal pain.  EP: Vikram Quinonez [phone]  Cardiology: Jason [phone]  ED course: HR 216bpm, Afib with RVR, given IV Metoprolol, heart rate decreased to the 70s NSR. Plan for tele monitoring and EP consult in CDU.

## 2021-07-28 NOTE — ED CDU PROVIDER INITIAL DAY NOTE - PRO INTERPRETER NEED 2
Querida Ava,    Nuestros registros indican que hemos intentado llamarle muchas veces sin disponibilidad. [place] es en lo que respecta a la boris que estaba programada para la programaciÃ³n de cirugÃ­a 06/04/18. La  gustarÃ­a que kishor evan boris antes de Junio con simpson socia  para programar la cirugÃ­a. Estamos preocupados porque simpson dunia es importante para nosotros. Por favor llame a nuestra oficina al [phone] para programar esta boris.    Atentamente,       Signatures   Electronically signed by : COLETTE Wise; Feb 20 2018  8:52AM CST    Electronically signed by : COLETTE Wise; Feb 20 2018  8:53AM CST    
English

## 2021-07-28 NOTE — CONSULT NOTE ADULT - ASSESSMENT
71F with history of WPW s/p ablation, pAfib on apixaban, HTN, HLD, and depression who presents with symptomatic Afib with RVR with conversion back to NSR after administration of 10 mg IV metoprolol.     Recommendations:  1. Continue to hold apixaban  2. Recommend metoprolol tartrate 25 mg q6h while in the hospital (instead of home Toprol XL)  3. Telemetry monitoring  4. Continue home flecainide 100 mg BID, buproprion  mg daily  5. Would hold home HCTZ   6. Replete K to 4 and Mg to 2  7. If rapid Afib with RVR again can trial IV metoprolol 5 mg     Please see attending addendum for final recommendations.    Alex Dugan MD  Cardiology Fellow, PGY4  Cell: [phone]    This is an overnight/weekend consult. During regular weekday hospital hours please contact appropriate day consult fellow. Contact information can be obtained on [employer] (Login: Etelos).

## 2021-07-28 NOTE — CONSULT NOTE ADULT - SUBJECTIVE AND OBJECTIVE BOX
Cardiac Electrophysiology Consult Note    Patient seen and evaluated at bedside    Chief Complaint: Palpitations    HPI:  71F with history of WPW s/p ablation, pAfib on apixaban, HTN, HLD, and depression who presented to the ED with complaint of palpitations and found to be in Afib with RVR with conversion back to NSR after administration of 10 mg IV metoprolol.     Patient follows with general cardiologist Dr. Gomze and EP Dr. Quinonez. Patient reports that she had an episode of Afib with RVR July 15th that self-resoled and Dr. Quinonez is aware. Patient believes that her Afib is being triggered by emotional stress related to issues with nursing care for her elderly mother. Other than symptomatic Afib (palpitations) patient denies CHF symptoms such as shortness of breath, LE swelling, orthopnea, and denies chest pain.     Of note, patient is currently not taking her apixaban (both doses not taken today) because she has planned dental procedure.    Per ED team patient was transferred to CDU for further monitoring. EP is consulted for recommendations.     PMHx:   HTN (hypertension)  Depression  GERD (gastroesophageal reflux disease)  Afib  WPW s/p ablation    PSHx:   History of hysterectomy    Allergies:  IV Contrast (Other)    Home Medications:  Eliquis 5 mg oral tablet: BID (28 Jul 2021 22:00)  flecainide 100 mg oral tablet: 1 tab(s) orally every 12 hours (28 Jul 2021 22:00)  hydroCHLOROthiazide 25 mg daily:  (28 Jul 2021 22:00)  Restasis:  (28 Jul 2021 22:00)  Toprol- mg daily:  (28 Jul 2021 22:00)  Wellbutrin 300 mg daily:  (28 Jul 2021 22:00)  Lovaza 2g BID    Current Medications:     FAMILY HISTORY:  No pertinent family history in first degree relatives    Social History:  Smoking History: Former smoker  Alcohol Use: Occasional   Drug Use: Denies    REVIEW OF SYSTEMS:  Constitutional:     [x ] negative [ ] fevers [ ] chills [ ] weight loss [ ] weight gain  HEENT:                  [x ] negative [ ] dry eyes [ ] eye irritation [ ] postnasal drip [ ] nasal congestion  CV:                         [ ] negative  [ ] chest pain [ ] orthopnea [x] palpitations [ ] murmur  Resp:                     [x ] negative [ ] cough [ ] shortness of breath [ ] dyspnea [ ] wheezing [ ] sputum [ ]hemoptysis  GI:                          [ x] negative [ ] nausea [ ] vomiting [ ] diarrhea [ ] constipation [ ] abd pain [ ] dysphagia   :                        [ x] negative [ ] dysuria [ ] nocturia [ ] hematuria [ ] increased urinary frequency  Musculoskeletal: [x ] negative [ ] back pain [ ] myalgias [ ] arthralgias [ ] fracture  Skin:                       [ x] negative [ ] rash [ ] itch  Neurological:        [ x] negative [ ] headache [ ] dizziness [ ] syncope [ ] weakness [ ] numbness  Psychiatric:           [ x] negative [ ] anxiety [ ] depression  Endocrine:            [ x] negative [ ] diabetes [ ] thyroid problem  Heme/Lymph:      [ x] negative [ ] anemia [ ] bleeding problem  Allergic/Immune: [ x] negative [ ] itchy eyes [ ] nasal discharge [ ] hives [ ] angioedema    [ x] All other systems negative    Physical Exam:  T(F): 98.5 (07-28), Max: 98.5 (07-28)  HR: 75 (07-28) (70 - 216)  BP: 104/63 (07-28) (104/63 - 158/97)  RR: 17 (07-28)  SpO2: 97% (07-28)  GENERAL: No acute distress, well-developed  HEAD:  Atraumatic, Normocephalic  ENT: EOMI, PERRLA, conjunctiva and sclera clear, Neck supple, No JVD, moist mucosa  CHEST/LUNG: Clear to auscultation bilaterally; No wheeze, equal breath sounds bilaterally   BACK: No spinal tenderness  HEART: Regular rate and rhythm; No murmurs, rubs, or gallops  ABDOMEN: Soft, Nontender, Nondistended; Bowel sounds present  EXTREMITIES:  No clubbing, cyanosis, or edema  PSYCH: Nl behavior, nl affect  NEUROLOGY: AAOx3, non-focal, cranial nerves intact  SKIN: Normal color, No rashes or lesions    LINES:  Peripheral IV    Cardiovascular Diagnostic Testing:    ECG: Personally reviewed:  NSR    Labs: Personally reviewed                        14.7 8.25  )-----------( 231      ( 28 Jul 2021 20:08 )             46.2     07-28    136  |  99  |  19  ----------------------------<  163<H>  3.6   |  21<L>  |  0.96    Ca    10.2      28 Jul 2021 20:08  Phos  2.6     07-28  Mg     1.9     07-28    TPro  7.6  /  Alb  4.3  /  TBili  0.3  /  DBili  x   /  AST  31  /  ALT  27  /  AlkPhos  93  07-28

## 2021-07-28 NOTE — ED CDU PROVIDER DISPOSITION NOTE - ATTENDING CONTRIBUTION TO CARE
Attending MD Gauthier:   I personally have seen and examined this patient.  Physician assistant note reviewed and agree on plan of care and except where noted.  See HPI, PE, and MDM for details.

## 2021-07-28 NOTE — ED CDU PROVIDER DISPOSITION NOTE - PATIENT PORTAL LINK FT
You can access the FollowMyHealth Patient Portal offered by [facility] by registering at the following website: [email or web address]. By joining [employer]’s FollowMyHealth portal, you will also be able to view your health information using other applications (apps) compatible with our system.

## 2021-07-28 NOTE — ED CDU PROVIDER DISPOSITION NOTE - NSFOLLOWUPINSTRUCTIONS_ED_ALL_ED_FT
Your diagnoses at this time was: Afib with RVR     Hydrate.     You may be contacted by our Emergency Department Referrals Coordinator to set up your follow up appointment within 24-48 hours of your discharge Monday- Friday. We recommend you follow up with your primary care provider and your electrophysiologist within the next 2-3 days, please bring all of your results with you.     Please return to the Emergency Department with new, worsening, or concerning symptoms, such as:  -Palpitations, fast heart rate    -Shortness of breath or trouble breathing  -Pressure, pain, tightness in chest  -Facial drooping, arm weakness, or speech difficulty   -Head injury or loss of consciousness   -Nonstop bleeding or an open wound     *More detailed information regarding your visit and discharge can be found by reviewing this packet Your diagnoses at this time was: Afib with RVR     Hydrate.     You may be contacted by our Emergency Department Referrals Coordinator to set up your follow up appointment within 24-48 hours of your discharge Monday- Friday. We recommend you follow up with your primary care provider and your electrophysiologist within the next 2-3 days, please bring all of your results with you.     Please return to the Emergency Department with new, worsening, or concerning symptoms, such as:  -Palpitations, fast heart rate    -Shortness of breath or trouble breathing  -Pressure, pain, tightness in chest  -Facial drooping, arm weakness, or speech difficulty   -Head injury or loss of consciousness   -Nonstop bleeding or an open wound     *More detailed information regarding your visit and discharge can be found by reviewing this packet    CONTINUE ALL HOME MEDICATIONS AS DIRECTED

## 2021-07-28 NOTE — ED ADULT NURSE NOTE - NSIMPLEMENTINTERV_GEN_ALL_ED
Implemented All Universal Safety Interventions:  [place] to call system. Call bell, personal items and telephone within reach. Instruct patient to call for assistance. Room bathroom lighting operational. Non-slip footwear when patient is off stretcher. Physically safe environment: no spills, clutter or unnecessary equipment. Stretcher in lowest position, wheels locked, appropriate side rails in place.

## 2021-07-28 NOTE — ED ADULT NURSE NOTE - OBJECTIVE STATEMENT
71 y old female with PMH of afib on Eliquis, GERD, depression, and HTN presents to the ED from home c/o irregular heartbeat. Pt reports "pounding heart beat" associated with SOB that began today. Pt reports she was going to the dentist today and did not take her Eliquis. Denies HA, fevers, chills, abd pain, n/v/d, weakness. Pt A&O x 4, respirations rapid and even. Peripheral pulses equal. Pt placed on cardiac monitor, in atrial fibrillation with RVR. HR 200s. MD Castro and MD Scanlon at bedside. Pt placed on pads. Pt placed on nonrebreather for comfort. Bed in lowest position, side rails up and call bell in reach.

## 2021-07-28 NOTE — ED PROVIDER NOTE - CLINICAL SUMMARY MEDICAL DECISION MAKING FREE TEXT BOX
Dr. Castro Note: rapid afib to 220 with symptoms, rate control, consider cardioversion if not amenable to rate control

## 2021-07-28 NOTE — ED CDU PROVIDER INITIAL DAY NOTE - NS ED ROS FT
Constitutional: No fever or chills  Eyes: No visual changes  CV: +chest pain no lower extremity edema  Resp:+ SOB no cough  GI: No abd pain. No nausea or vomiting. No diarrhea.   : No dysuria, hematuria.   MSK: No musculoskeletal pain  Skin: No rash  Psych: No complaints   Neuro: No headache. No numbness or tingling. No weakness.

## 2021-07-28 NOTE — ED PROVIDER NOTE - PROGRESS NOTE DETAILS
melanie hagan pgy3: spoke with house cardiology, aware of pt agrees with plan of tele monitoring overnight with EP consult in the AM. Dr. Castro Note: pt broke rapid afib easily after metoprolol 10mg IVP, back in sinus 70s.  Given pt's history, would be more comfortable observing patient overnight for recurrence of rapid afib, consult with EP on disposition plan, likely for dc in AM if pt remains stable.  Would hold a/c for now given pt likely for dc tomorrow and can still obtain dental procedure.  No indication for emergent imaging or echo at this time.

## 2021-07-28 NOTE — ED ADULT NURSE NOTE - SEPSIS REFERENCE DATA CRITERIA 1
IV placed by Danae Schaumann RN. Unable to obtain labs with draw. Shanell Hernández, lab at bedside to attempt. Unable to at this time. Will update Dr. Mica Kraft. Pt resting in bed. Resp regular. Call light in reach. Pt remains diaphoretic. IV fluids restrated.       Lissy Belcher RN  01/26/21 6190 Abormal VS: Temp > 100F or < 96.8F; SBP < 90 mmHG; HR > 120bpm; Resp > 24/min

## 2021-07-28 NOTE — ED CDU PROVIDER DISPOSITION NOTE - NS ED MD DISPO DISCHARGE
Central venous sheath removed from right neck this morning without complication  Manual pressure held X 5 minutes with hemostasis  Dressing applied  Home

## 2021-07-28 NOTE — ED CDU PROVIDER INITIAL DAY NOTE - DETAILS
Afib  -Tele  -EP consult  -Vitals every 4 hours, frequent reevaluations, LIVIA Castro  -Per Dr. Castro, hold Eliquis tonight and repeat troponin not needed, likely elevated 2/2 demand patient without chest pain at time of CDU evaluation

## 2021-07-28 NOTE — ED PROVIDER NOTE - ATTENDING CONTRIBUTION TO CARE
Pt with chest pain, palpitations, and feelings similar to previous fast irregular heart rate, pt found with -200 on apple watch, here for eval.  Pt on flecanide, metoprolol, and Eliquis, held for last day due to planned dental procedure.  Pt appears anxious, tachycardic to 220, narrow complex, BP stable 150s, clear lungs, no JVD, ab soft, nt, neuro intact.  Will give additional metoprolol IV and reassess.

## 2021-07-29 VITALS
OXYGEN SATURATION: 100 % | DIASTOLIC BLOOD PRESSURE: 57 MMHG | HEART RATE: 64 BPM | TEMPERATURE: 98 F | RESPIRATION RATE: 17 BRPM | SYSTOLIC BLOOD PRESSURE: 114 MMHG

## 2021-07-29 LAB
SARS-COV-2 RNA SPEC QL NAA+PROBE: SIGNIFICANT CHANGE UP
TSH SERPL-MCNC: 1.12 UIU/ML — SIGNIFICANT CHANGE UP (ref 0.27–4.2)

## 2021-07-29 PROCEDURE — 84484 ASSAY OF TROPONIN QUANT: CPT

## 2021-07-29 PROCEDURE — 99291 CRITICAL CARE FIRST HOUR: CPT | Mod: 25

## 2021-07-29 PROCEDURE — 82553 CREATINE MB FRACTION: CPT

## 2021-07-29 PROCEDURE — U0003: CPT

## 2021-07-29 PROCEDURE — 99217: CPT

## 2021-07-29 PROCEDURE — 96374 THER/PROPH/DIAG INJ IV PUSH: CPT

## 2021-07-29 PROCEDURE — G0378: CPT

## 2021-07-29 PROCEDURE — 83735 ASSAY OF MAGNESIUM: CPT

## 2021-07-29 PROCEDURE — 80053 COMPREHEN METABOLIC PANEL: CPT

## 2021-07-29 PROCEDURE — 93005 ELECTROCARDIOGRAM TRACING: CPT

## 2021-07-29 PROCEDURE — 85025 COMPLETE CBC W/AUTO DIFF WBC: CPT

## 2021-07-29 PROCEDURE — 71045 X-RAY EXAM CHEST 1 VIEW: CPT

## 2021-07-29 PROCEDURE — 84100 ASSAY OF PHOSPHORUS: CPT

## 2021-07-29 PROCEDURE — U0005: CPT

## 2021-07-29 PROCEDURE — 84443 ASSAY THYROID STIM HORMONE: CPT

## 2021-07-29 RX ORDER — METOPROLOL TARTRATE 50 MG
100 TABLET ORAL DAILY
Refills: 0 | Status: DISCONTINUED | OUTPATIENT
Start: 2021-07-29 | End: 2021-08-01

## 2021-07-29 RX ORDER — POTASSIUM CHLORIDE 20 MEQ
40 PACKET (EA) ORAL ONCE
Refills: 0 | Status: COMPLETED | OUTPATIENT
Start: 2021-07-29 | End: 2021-07-29

## 2021-07-29 RX ORDER — FLECAINIDE ACETATE 50 MG
100 TABLET ORAL
Refills: 0 | Status: DISCONTINUED | OUTPATIENT
Start: 2021-07-29 | End: 2021-08-01

## 2021-07-29 RX ORDER — APIXABAN 2.5 MG/1
5 TABLET, FILM COATED ORAL EVERY 12 HOURS
Refills: 0 | Status: DISCONTINUED | OUTPATIENT
Start: 2021-07-29 | End: 2021-08-01

## 2021-07-29 RX ORDER — METOPROLOL TARTRATE 50 MG
25 TABLET ORAL EVERY 6 HOURS
Refills: 0 | Status: DISCONTINUED | OUTPATIENT
Start: 2021-07-29 | End: 2021-08-01

## 2021-07-29 RX ORDER — BUPROPION HYDROCHLORIDE 150 MG/1
300 TABLET, EXTENDED RELEASE ORAL DAILY
Refills: 0 | Status: DISCONTINUED | OUTPATIENT
Start: 2021-07-29 | End: 2021-08-01

## 2021-07-29 RX ADMIN — BUPROPION HYDROCHLORIDE 300 MILLIGRAM(S): 150 TABLET, EXTENDED RELEASE ORAL at 09:45

## 2021-07-29 RX ADMIN — Medication 100 MILLIGRAM(S): at 11:29

## 2021-07-29 RX ADMIN — APIXABAN 5 MILLIGRAM(S): 2.5 TABLET, FILM COATED ORAL at 11:29

## 2021-07-29 RX ADMIN — Medication 40 MILLIEQUIVALENT(S): at 04:18

## 2021-07-29 RX ADMIN — Medication 25 MILLIGRAM(S): at 04:20

## 2021-07-29 RX ADMIN — Medication 100 MILLIGRAM(S): at 09:45

## 2021-07-29 NOTE — PROGRESS NOTE ADULT - ATTENDING COMMENTS
Patient seen and examined in ED with Dr. Aquino. Has PAF with LBBB though remote possibility of [place]. Will need another ablation but defer further plans to Dr. Quinonez

## 2021-07-29 NOTE — ED CDU PROVIDER SUBSEQUENT DAY NOTE - HISTORY
No interval changes since initial CDU provider note. Pt feels well without complaint. NAD VSS. no events on tele. Plan for continued tele monitoring and EP consult, in the setting fo Afib with RVR now resolved.Rene Dobbs

## 2021-07-29 NOTE — ED ADULT NURSE REASSESSMENT NOTE - COMFORT CARE
ambulated to bathroom/darkened lights/plan of care explained/po fluids offered/repositioned/warm blanket provided
ambulated to bathroom/po fluids offered/wait time explained

## 2021-07-29 NOTE — ED ADULT NURSE REASSESSMENT NOTE - GENERAL PATIENT STATE
comfortable appearance/cooperative/improvement verbalized/resting/sleeping/smiling/interactive
comfortable appearance

## 2021-07-29 NOTE — ED ADULT NURSE REASSESSMENT NOTE - NS ED NURSE REASSESS COMMENT FT1
11.40 Pt is evaluated by CDU MD Disha Oconnor  pt is feeling better.  Pt is discharged . Ml out  YOKASTA Tyson   explained the follow up care & gave the discharge summary  . Pt has stable vitals steady gait A&OX 4 at the time of Discharge
07.00 Am Received the Pt from  SYLVIE Estevez  . Pt is Observed for renal Colic . Received the Pt A&OX 4 obeys commands [place] N/V/D fever chills cp SOB   Comfort care & safety measures continued  IV site looks clean & dry no signs of infiltration noted pt denies  pain IV site .  Pt is advised to call for help  call bell with in the reach pt verbalized the understanding . pending CDU  MD art . GCS 15/15 A&OX 4 PERRLA  size 3 Strong upper & lower extremities steady gait   No facial droop  No Hand Leg drop denies numbness tingling  Pt is NSR 63/mt  on the Monitor .Continue to monitor
Received pt from SYLVIE Gaines, received pt alert and responsive, oriented x4, denies any respiratory distress, SOB, or difficulty breathing. Pt transferred to CDU for irregular heartrate. Pt is currently NSR hr: 70's. No complaints of chest pain, pressure, tightness, SOB, diaphoresis, dizziness, lightheadedness, NV, F/C, heart palpitations. IV in place, patent and free of signs of infiltration, V/S stable, pt afebrile, pt denies pain at this time. Pt educated on unit and unit rules, instructed patient to notify RN of any needed assistance, Pt verbalizes understanding, Call bell placed within reach. Safety maintained. Will continue to monitor.

## 2021-07-29 NOTE — ED CDU PROVIDER SUBSEQUENT DAY NOTE - PROGRESS NOTE DETAILS
Per cardiology note, recommending metoprolol tartrate 25 mg q6h and to hold HCTZ. OK to hold Eliquis. - Patience Dobbs PA-C YOKASTA Dodson: Patient seen at bedside in NAD.  VSS.  Patient resting comfortably without complaints. rate controlled 57bpm. pending EP attending recs YOKASTA Dodson: seen by Dr. Quinonez EP attending who recommended to continue Toprol XL 100mg and wants pt to get Torpol prior to discharge

## 2021-07-29 NOTE — PROGRESS NOTE ADULT - ASSESSMENT
71F with history of WPW s/p ablation, pAfib on apixaban, HTN, HLD, and depression who presents with symptomatic Afib with RVR along with aberrancy with conversion back to NSR after administration of 10 mg IV metoprolol.     Recommendations: - possible Afib with RVR and abberancy and will likely need an ablation as outpatient  1. Continue AC with eliquis 5 BID  2. Continue toprol 100mg daily  3. Continue home flecainide 100 mg BID  Please arrange follow up with Dr Quinonez upon discharge    Jackson Aquino MD  Cardiology Fellow PGY-4  [phone]  All Cardiology service information can be found 24/7 on [email or web address], password: [email or web address].

## 2021-07-29 NOTE — PROGRESS NOTE ADULT - SUBJECTIVE AND OBJECTIVE BOX
Patient seen and examined at bedside.    Overnight Events: No acute events overnight. Now in sinus rhythm with HRs 60-70s.    Current Meds:  apixaban 5 milliGRAM(s) Oral every 12 hours  buPROPion XL (24-Hour) 300 milliGRAM(s) Oral daily  flecainide 100 milliGRAM(s) Oral two times a day  metoprolol succinate  milliGRAM(s) Oral daily  metoprolol tartrate 25 milliGRAM(s) Oral every 6 hours      PAST MEDICAL & SURGICAL HISTORY:  HTN (hypertension)    Depression    GERD (gastroesophageal reflux disease)    Afib    History of hysterectomy        Vitals:  T(F): 97.7 (07-29), Max: 98.5 (07-28)  HR: 63 (07-29) (63 - 216)  BP: 105/61 (07-29) (104/51 - 158/97)  RR: 16 (07-29)  SpO2: 97% (07-29)  I&O's Summary      Physical Exam:  Appearance: No acute distress; well appearing  Eyes: PERRL, EOMI, pink conjunctiva  HENT: Normal oral mucosa  Cardiovascular: RRR, S1, S2, no murmurs, rubs, or gallops; no edema; no JVD  Respiratory: Clear to auscultation bilaterally  Gastrointestinal: soft, non-tender, non-distended with normal bowel sounds  Musculoskeletal: No clubbing; no joint deformity   Neurologic: Non-focal  Lymphatic: No lymphadenopathy  Psychiatry: AAOx3, mood & affect appropriate  Skin: No rashes, ecchymoses, or cyanosis                          14.7   8.25  )-----------( 231      ( 28 Jul 2021 20:08 )             46.2     07-28    136  |  99  |  19  ----------------------------<  163<H>  3.6   |  21<L>  |  0.96    Ca    10.2      28 Jul 2021 20:08  Phos  2.6     07-28  Mg     1.9     07-28    TPro  7.6  /  Alb  4.3  /  TBili  0.3  /  DBili  x   /  AST  31  /  ALT  27  /  AlkPhos  93  07-28      CARDIAC MARKERS ( 28 Jul 2021 20:08 )  x     / x     / x     / x     / 3.2 ng/mL              New ECG(s): Personally reviewed    Echo:    Stress Testing:     Cath:    Imaging:    Interpretation of Telemetry:

## 2021-07-29 NOTE — ED CDU PROVIDER SUBSEQUENT DAY NOTE - MEDICAL DECISION MAKING DETAILS
I have personally performed a face to face diagnostic evaluation on this patient.  I have reviewed the ACP note and agree with the history, exam, and plan of care, except as noted.  History and Exam by me shows       Pt observed overnight for telemetry monitoring and EP consultation sp rapid afib, converted sp IV lopressor x 1 yesterday. Remains in NSR overnight, no complaints but is very stressed about her mother. Pending EP consultation this morning. Disposition will be as per EP. Continue PO lopressor, flecainide. Eliquis being held for planned dental procedure this week.

## 2021-08-02 ENCOUNTER — APPOINTMENT (OUTPATIENT)
Dept: ELECTROPHYSIOLOGY | Facility: CLINIC | Age: 71
End: 2021-08-02
Payer: MEDICARE

## 2021-08-02 VITALS
TEMPERATURE: 97.7 F | DIASTOLIC BLOOD PRESSURE: 90 MMHG | HEART RATE: 52 BPM | HEIGHT: 63 IN | SYSTOLIC BLOOD PRESSURE: 170 MMHG | OXYGEN SATURATION: 97 % | WEIGHT: 175 LBS | BODY MASS INDEX: 31.01 KG/M2

## 2021-08-02 DIAGNOSIS — R00.1 BRADYCARDIA, UNSPECIFIED: ICD-10-CM

## 2021-08-02 PROBLEM — I48.91 UNSPECIFIED ATRIAL FIBRILLATION: Chronic | Status: ACTIVE | Noted: 2021-07-28

## 2021-08-02 PROCEDURE — 99213 OFFICE O/P EST LOW 20 MIN: CPT

## 2021-08-02 PROCEDURE — 93000 ELECTROCARDIOGRAM COMPLETE: CPT

## 2021-08-20 PROBLEM — R00.1 BRADYCARDIA: Status: ACTIVE | Noted: 2021-07-14

## 2021-08-20 NOTE — DISCUSSION/SUMMARY
[FreeTextEntry1] : Patient has had recurrent atrial fibrillation on flecainide daily.  She has had as well.  At baseline she is bradycardic .  Previous echo had shown EF 56% and left atrial diameter 4.1 cm.\par \par He seems to have stress testing with possible trigger.\par \par She has a family history atrial fibrillation as well.\par \par We have previously discussed risk factor patient.  She does have a prior history of sleep apnea and hypertension.  She denies any excessive caffeine or alcohol intake.\par \par She most likely need catheter ablation because of a high chance of recurrent atrial fibrillation.  She would prefer not to have the procedure currently.  She has had no evidence of ventricular preexcitation.  We will increase her flecainide to 150 mg in the morning and 100 mg p.m. dose.  Continue her beta-blocker and anticoagulation.\par \par She had prior stress test performed 2020.  This was an exercise treadmill test.  She will follow up with her cardiologist and we will recommend a nuclear stress test given the fact that she is on a type Ic drug.\par \par She understands that if she has recurrent atrial fibrillation on a higher dose of flecainide that she would consider catheter ablation procedure.  Continue on beta-blocker as well.\par \par Follow-up electrophysiology assessment in 3 to 6 months depending whether she has recurrent A. fib.

## 2021-08-20 NOTE — HISTORY OF PRESENT ILLNESS
[FreeTextEntry1] : Patient is a 71-year-old woman who is seen in follow-up evaluation for ablation.  She recently presented seen in the emergency room at [facility] requested.  She was found to be in A. fib with RVR which converted back to sinus rhythm after 10 mg IV metoprolol.  She had a prior episode of atrial fibrillation in July 15, 2021 as well.  She feels that her AF is triggered by emotional stress as it relates to issues with care of her elderly mother.  She has occasional symptoms of shortness of breath and palpitations.  She has no dizziness, lightheadedness, chest pain, syncope or presyncope.  She was previously prescribed Eliquis.  She has been on flecainide 100 mg twice daily as well as Toprol- mg daily.  In addition she has been on hydrochlorothiazide 25 mg/day.\par \par There is no prior history of diabetes, TIA or stroke.  She has had a prior history of hypertension.  CAD with previous MI.

## 2021-08-20 NOTE — PHYSICAL EXAM
[No Acute Distress] : no acute distress [Normal Conjunctiva] : normal conjunctiva [Normal Venous Pressure] : normal venous pressure [Normal S1, S2] : normal S1, S2 [No Murmur] : no murmur [No Rub] : no rub [Non Tender] : non-tender [Normal Gait] : normal gait [No Edema] : no edema [Normal] : alert and oriented, normal memory

## 2021-08-20 NOTE — REVIEW OF SYSTEMS
[Weight Gain (___ Lbs)] : no recent weight gain [Feeling Fatigued] : not feeling fatigued [Blurry Vision] : no blurred vision [Sore Throat] : no sore throat [Dyspnea on exertion] : not dyspnea during exertion [Chest Discomfort] : no chest discomfort [Syncope] : no syncope [Abdominal Pain] : no abdominal pain [Under Stress] : under stress [Easy Bleeding] : no tendency for easy bleeding

## 2021-08-31 ENCOUNTER — INPATIENT (INPATIENT)
Facility: HOSPITAL | Age: 71
LOS: 2 days | Discharge: HOME | DRG: 308 | End: 2021-09-03
Attending: INTERNAL MEDICINE | Admitting: INTERNAL MEDICINE
Payer: MEDICARE

## 2021-08-31 VITALS
HEART RATE: 71 BPM | WEIGHT: 149.91 LBS | SYSTOLIC BLOOD PRESSURE: 118 MMHG | RESPIRATION RATE: 19 BRPM | OXYGEN SATURATION: 100 % | DIASTOLIC BLOOD PRESSURE: 76 MMHG | TEMPERATURE: 99 F | HEIGHT: 63 IN

## 2021-08-31 DIAGNOSIS — Z90.710 ACQUIRED ABSENCE OF BOTH CERVIX AND UTERUS: Chronic | ICD-10-CM

## 2021-08-31 DIAGNOSIS — I48.91 UNSPECIFIED ATRIAL FIBRILLATION: ICD-10-CM

## 2021-08-31 LAB
ALBUMIN SERPL ELPH-MCNC: 4.1 G/DL — SIGNIFICANT CHANGE UP (ref 3.3–5)
ALP SERPL-CCNC: 72 U/L — SIGNIFICANT CHANGE UP (ref 40–120)
ALT FLD-CCNC: 19 U/L — SIGNIFICANT CHANGE UP (ref 10–45)
AST SERPL-CCNC: 21 U/L — SIGNIFICANT CHANGE UP (ref 10–40)
BASOPHILS # BLD AUTO: 0.02 K/UL — SIGNIFICANT CHANGE UP (ref 0–0.2)
BASOPHILS NFR BLD AUTO: 0.3 % — SIGNIFICANT CHANGE UP (ref 0–2)
BILIRUB SERPL-MCNC: 0.4 MG/DL — SIGNIFICANT CHANGE UP (ref 0.2–1.2)
BUN SERPL-MCNC: 14 MG/DL — SIGNIFICANT CHANGE UP (ref 7–23)
CALCIUM SERPL-MCNC: 9.8 MG/DL — SIGNIFICANT CHANGE UP (ref 8.4–10.5)
CHLORIDE SERPL-SCNC: 99 MMOL/L — SIGNIFICANT CHANGE UP (ref 96–108)
CO2 SERPL-SCNC: 21 MMOL/L — LOW (ref 22–31)
CREAT SERPL-MCNC: 0.88 MG/DL — SIGNIFICANT CHANGE UP (ref 0.5–1.3)
EOSINOPHIL # BLD AUTO: 0.15 K/UL — SIGNIFICANT CHANGE UP (ref 0–0.5)
EOSINOPHIL NFR BLD AUTO: 1.9 % — SIGNIFICANT CHANGE UP (ref 0–6)
GLUCOSE SERPL-MCNC: 98 MG/DL — SIGNIFICANT CHANGE UP (ref 70–99)
HCT VFR BLD CALC: 43.5 % — SIGNIFICANT CHANGE UP (ref 34.5–45)
HGB BLD-MCNC: 14.4 G/DL — SIGNIFICANT CHANGE UP (ref 11.5–15.5)
IMM GRANULOCYTES NFR BLD AUTO: 0.3 % — SIGNIFICANT CHANGE UP (ref 0–1.5)
LYMPHOCYTES # BLD AUTO: 0.96 K/UL — LOW (ref 1–3.3)
LYMPHOCYTES # BLD AUTO: 12.1 % — LOW (ref 13–44)
MAGNESIUM SERPL-MCNC: 2.1 MG/DL — SIGNIFICANT CHANGE UP (ref 1.6–2.6)
MCHC RBC-ENTMCNC: 29.3 PG — SIGNIFICANT CHANGE UP (ref 27–34)
MCHC RBC-ENTMCNC: 33.1 GM/DL — SIGNIFICANT CHANGE UP (ref 32–36)
MCV RBC AUTO: 88.4 FL — SIGNIFICANT CHANGE UP (ref 80–100)
MONOCYTES # BLD AUTO: 1.09 K/UL — HIGH (ref 0–0.9)
MONOCYTES NFR BLD AUTO: 13.7 % — SIGNIFICANT CHANGE UP (ref 2–14)
NEUTROPHILS # BLD AUTO: 5.7 K/UL — SIGNIFICANT CHANGE UP (ref 1.8–7.4)
NEUTROPHILS NFR BLD AUTO: 71.7 % — SIGNIFICANT CHANGE UP (ref 43–77)
NRBC # BLD: 0 /100 WBCS — SIGNIFICANT CHANGE UP (ref 0–0)
PLATELET # BLD AUTO: 195 K/UL — SIGNIFICANT CHANGE UP (ref 150–400)
POTASSIUM SERPL-MCNC: 3.5 MMOL/L — SIGNIFICANT CHANGE UP (ref 3.5–5.3)
POTASSIUM SERPL-SCNC: 3.5 MMOL/L — SIGNIFICANT CHANGE UP (ref 3.5–5.3)
PROT SERPL-MCNC: 7.5 G/DL — SIGNIFICANT CHANGE UP (ref 6–8.3)
RBC # BLD: 4.92 M/UL — SIGNIFICANT CHANGE UP (ref 3.8–5.2)
RBC # FLD: 13.8 % — SIGNIFICANT CHANGE UP (ref 10.3–14.5)
SARS-COV-2 RNA SPEC QL NAA+PROBE: DETECTED
SODIUM SERPL-SCNC: 137 MMOL/L — SIGNIFICANT CHANGE UP (ref 135–145)
TROPONIN T, HIGH SENSITIVITY RESULT: 10 NG/L — SIGNIFICANT CHANGE UP (ref 0–51)
TROPONIN T, HIGH SENSITIVITY RESULT: 15 NG/L — SIGNIFICANT CHANGE UP (ref 0–51)
WBC # BLD: 7.94 K/UL — SIGNIFICANT CHANGE UP (ref 3.8–10.5)
WBC # FLD AUTO: 7.94 K/UL — SIGNIFICANT CHANGE UP (ref 3.8–10.5)

## 2021-08-31 PROCEDURE — 99233 SBSQ HOSP IP/OBS HIGH 50: CPT

## 2021-08-31 PROCEDURE — 99291 CRITICAL CARE FIRST HOUR: CPT | Mod: CS

## 2021-08-31 PROCEDURE — 93010 ELECTROCARDIOGRAM REPORT: CPT

## 2021-08-31 PROCEDURE — 71045 X-RAY EXAM CHEST 1 VIEW: CPT | Mod: 26

## 2021-08-31 RX ORDER — METOPROLOL TARTRATE 50 MG
5 TABLET ORAL ONCE
Refills: 0 | Status: COMPLETED | OUTPATIENT
Start: 2021-08-31 | End: 2021-08-31

## 2021-08-31 RX ORDER — METOPROLOL TARTRATE 50 MG
0 TABLET ORAL
Qty: 0 | Refills: 0 | DISCHARGE

## 2021-08-31 RX ORDER — IPRATROPIUM/ALBUTEROL SULFATE 18-103MCG
3 AEROSOL WITH ADAPTER (GRAM) INHALATION ONCE
Refills: 0 | Status: COMPLETED | OUTPATIENT
Start: 2021-08-31 | End: 2021-08-31

## 2021-08-31 RX ORDER — METOPROLOL TARTRATE 50 MG
100 TABLET ORAL DAILY
Refills: 0 | Status: DISCONTINUED | OUTPATIENT
Start: 2021-08-31 | End: 2021-09-02

## 2021-08-31 RX ORDER — BUPROPION HYDROCHLORIDE 150 MG/1
0 TABLET, EXTENDED RELEASE ORAL
Qty: 0 | Refills: 0 | DISCHARGE

## 2021-08-31 RX ORDER — HYDROCHLOROTHIAZIDE 25 MG
0 TABLET ORAL
Qty: 0 | Refills: 0 | DISCHARGE

## 2021-08-31 RX ORDER — FLECAINIDE ACETATE 50 MG
150 TABLET ORAL ONCE
Refills: 0 | Status: COMPLETED | OUTPATIENT
Start: 2021-08-31 | End: 2021-08-31

## 2021-08-31 RX ORDER — APIXABAN 2.5 MG/1
0 TABLET, FILM COATED ORAL
Qty: 0 | Refills: 0 | DISCHARGE

## 2021-08-31 RX ORDER — CYCLOSPORINE 0.5 MG/ML
0 EMULSION OPHTHALMIC
Qty: 0 | Refills: 0 | DISCHARGE

## 2021-08-31 RX ORDER — IPRATROPIUM/ALBUTEROL SULFATE 18-103MCG
1 AEROSOL WITH ADAPTER (GRAM) INHALATION
Refills: 0 | Status: DISCONTINUED | OUTPATIENT
Start: 2021-08-31 | End: 2021-08-31

## 2021-08-31 RX ORDER — FLECAINIDE ACETATE 50 MG
100 TABLET ORAL
Refills: 0 | Status: DISCONTINUED | OUTPATIENT
Start: 2021-08-31 | End: 2021-09-01

## 2021-08-31 RX ORDER — UBROGEPANT 100 MG/1
1 TABLET ORAL
Qty: 0 | Refills: 0 | DISCHARGE

## 2021-08-31 RX ORDER — ACETAMINOPHEN 500 MG
650 TABLET ORAL ONCE
Refills: 0 | Status: COMPLETED | OUTPATIENT
Start: 2021-08-31 | End: 2021-08-31

## 2021-08-31 RX ORDER — APIXABAN 2.5 MG/1
5 TABLET, FILM COATED ORAL EVERY 12 HOURS
Refills: 0 | Status: DISCONTINUED | OUTPATIENT
Start: 2021-08-31 | End: 2021-09-03

## 2021-08-31 RX ORDER — METOPROLOL TARTRATE 50 MG
5 TABLET ORAL ONCE
Refills: 0 | Status: DISCONTINUED | OUTPATIENT
Start: 2021-08-31 | End: 2021-08-31

## 2021-08-31 RX ORDER — IPRATROPIUM/ALBUTEROL SULFATE 18-103MCG
3 AEROSOL WITH ADAPTER (GRAM) INHALATION
Refills: 0 | Status: DISCONTINUED | OUTPATIENT
Start: 2021-08-31 | End: 2021-08-31

## 2021-08-31 RX ADMIN — Medication 100 MILLIGRAM(S): at 10:27

## 2021-08-31 RX ADMIN — Medication 5 MILLIGRAM(S): at 10:58

## 2021-08-31 RX ADMIN — Medication 650 MILLIGRAM(S): at 20:41

## 2021-08-31 RX ADMIN — Medication 100 MILLIGRAM(S): at 11:58

## 2021-08-31 RX ADMIN — APIXABAN 5 MILLIGRAM(S): 2.5 TABLET, FILM COATED ORAL at 18:09

## 2021-08-31 RX ADMIN — Medication 5 MILLIGRAM(S): at 11:09

## 2021-08-31 RX ADMIN — Medication 3 MILLILITER(S): at 10:31

## 2021-08-31 NOTE — H&P ADULT - HISTORY OF PRESENT ILLNESS
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 Tested positive for COVID four days ago. Has been having cough, weakness and generalized malaise for 1 wk.  also positive for COVID. Patient endorses increased stress at home secondary to mother's care. She has not taken her usual morning meds this morning as she was only due for them at the time of ER arrival. Denies LE swelling, orthopnea, and chest pain. Tmax 100.2 this morning, took Tylenol 1 hr ago. O2 sat went down to 92 earlier today as measured by home pulse ox. No sick contacts. No recent travel.  Patient's main concern is her heart rate. Has a known history of Afib that can be hard to control. Currently she is having multiple episodes daily while having COVID that is leading her to have chest discomfort, lightheadedness and increased fatigue.

## 2021-08-31 NOTE — H&P ADULT - NSHPLABSRESULTS_GEN_ALL_CORE
14.4   7.94  )-----------( 195      ( 31 Aug 2021 10:37 )             43.5     08-31    137  |  99  |  14  ----------------------------<  98  3.5   |  21<L>  |  0.88    Ca    9.8      31 Aug 2021 10:37  Mg     2.1     08-31    TPro  7.5  /  Alb  4.1  /  TBili  0.4  /  DBili  x   /  AST  21  /  ALT  19  /  AlkPhos  72  08-31

## 2021-08-31 NOTE — CONSULT NOTE ADULT - SUBJECTIVE AND OBJECTIVE BOX
CHIEF COMPLAINT: Shortness of breath in the setting of COVID-19+    HISTORY OF PRESENT ILLNESS:  71 year old female with history of WPW s/p ablation (2015 by Dr. Quinonez), pAfib (on apixaban), HTN, HLD, and depression who presents with shortness of breath. Tested positive for COVID19 four days (8/28/21) and her  tested positive for COVID19 on 8/27/21. Patient endorses increased stress at home secondary to mother's care. She has not taken her usual morning meds this morning as she was only due for them at the time of ER arrival. Upon arrival to the ER she was in atrial fibrillation with rapid ventricular response with HR up to the 180's, now she is in and out of Afib (100-110's) and NSR (50-70's). Of note she was recently admitted to the [facility] ED (8/28/21-8/29/21) for symptomatic Afib with RVR along with aberrancy with conversion back to NSR after administration of 10 mg IV metoprolol. The plan was to follow up with her EP attending (Dr. Quinonez) for Afib ablation as outpatient.       Allergies    IV Contrast (Other)    Intolerances    	  HOME MEDICATIONS:  apixaban 5 milliGRAM(s) Oral every 12 hours  buPROPion XL (24-Hour) 300 milliGRAM(s) Oral daily  flecainide 100 milliGRAM(s) Oral two times a day  metoprolol succinate  milliGRAM(s) Oral daily        PAST MEDICAL & SURGICAL HISTORY:  HTN (hypertension)  Depression  GERD (gastroesophageal reflux disease)  Afib  History of hysterectomy      FAMILY HISTORY:  No pertinent family history in first degree relatives      SOCIAL HISTORY: Retired, lives with spouse, former smoker (quit in 2015), social alcohol intake, drinks 1 cup of coffee in the morning (has not had caffeine for 3 days), denies elicit drug use, independent with ADL      REVIEW OF SYSTEMS: Information obtained via telephone  See HPI. Otherwise, 10 point ROS done and otherwise negative.    PHYSICAL EXAM:  T(C): 37.2 (08-31-21 @ 16:30), Max: 37.3 (08-31-21 @ 10:36)  HR: 103 (08-31-21 @ 16:30) (71 - 185)  BP: 126/83 (08-31-21 @ 16:30) (107/59 - 126/83)  RR: 20 (08-31-21 @ 16:30) (11 - 20)  SpO2: 98% (08-31-21 @ 16:30) (98% - 100%)  Wt(kg): --  I&O's Summary    PE: per ED/primary team       LABS:	 	    CBC Full  -  ( 31 Aug 2021 10:37 )  WBC Count : 7.94 K/uL  Hemoglobin : 14.4 g/dL  Hematocrit : 43.5 %  Platelet Count - Automated : 195 K/uL  Mean Cell Volume : 88.4 fl  Mean Cell Hemoglobin : 29.3 pg  Mean Cell Hemoglobin Concentration : 33.1 gm/dL  Auto Neutrophil # : 5.70 K/uL  Auto Lymphocyte # : 0.96 K/uL  Auto Monocyte # : 1.09 K/uL  Auto Eosinophil # : 0.15 K/uL  Auto Basophil # : 0.02 K/uL  Auto Neutrophil % : 71.7 %  Auto Lymphocyte % : 12.1 %  Auto Monocyte % : 13.7 %  Auto Eosinophil % : 1.9 %  Auto Basophil % : 0.3 %    08-31    137  |  99  |  14  ----------------------------<  98  3.5   |  21<L>  |  0.88    Ca    9.8      31 Aug 2021 10:37  Mg     2.1     08-31    TPro  7.5  /  Alb  4.1  /  TBili  0.4  /  DBili  x   /  AST  21  /  ALT  19  /  AlkPhos  72  08-31    Thyroid Stimulating Hormone, Serum (07.29.21 @ 00:21)    Thyroid Stimulating Hormone, Serum: 1.12 uIU/mL      TELEMETRY: pAFib w/ RVR<--> SR, NSVT up to 18 beats  	    ECG: personally reviewed  	    < from: Xray Chest 1 View AP/PA (08.31.21 @ 10:34) >  IMPRESSION:    The heart is normal in size. Lungs areclear. No pleural effusion. No pneumothorax. Degenerative changes of the thoracic spine.    --- End of Report ---    < end of copied text >       CHIEF COMPLAINT: Shortness of breath in the setting of COVID-19+    HISTORY OF PRESENT ILLNESS:  71 year old female with history of WPW s/p ablation (2015 by Dr. Quinonez), pAfib (on apixaban), HTN, HLD, and depression who presents with shortness of breath. Tested positive for COVID19 four days (8/28/21) and her  tested positive for COVID19 on 8/27/21. Patient endorses increased stress at home secondary to mother's care. She has not taken her usual morning meds this morning as she was only due for them at the time of ER arrival. Upon arrival to the ER she was in atrial fibrillation with rapid ventricular response with HR up to the 180's, now she is in and out of Afib (100-110's) and NSR (50-70's). Of note she was recently admitted to the [facility] ED (8/28/21-8/29/21) for symptomatic Afib with RVR along with aberrancy with conversion back to NSR after administration of 10 mg IV metoprolol. The plan was to follow up with her EP attending (Dr. Quinonez) for Afib ablation as outpatient.       Allergies    IV Contrast (Other)    Intolerances    	  HOME MEDICATIONS:  apixaban 5 milliGRAM(s) Oral every 12 hours  buPROPion XL (24-Hour) 300 milliGRAM(s) Oral daily  flecainide 100 milliGRAM(s) Oral two times a day  metoprolol succinate  milliGRAM(s) Oral daily        PAST MEDICAL & SURGICAL HISTORY:  HTN (hypertension)  Depression  GERD (gastroesophageal reflux disease)  Afib  History of hysterectomy      FAMILY HISTORY:  No pertinent family history in first degree relatives      SOCIAL HISTORY: Retired, lives with spouse, former smoker (quit in 2015), social alcohol intake, drinks 1 cup of coffee in the morning (has not had caffeine for 3 days), denies elicit drug use, independent with ADL      REVIEW OF SYSTEMS: Information obtained via telephone  See HPI. Otherwise, 10 point ROS done and otherwise negative.    PHYSICAL EXAM:  T(C): 37.2 (08-31-21 @ 16:30), Max: 37.3 (08-31-21 @ 10:36)  HR: 103 (08-31-21 @ 16:30) (71 - 185)  BP: 126/83 (08-31-21 @ 16:30) (107/59 - 126/83)  RR: 20 (08-31-21 @ 16:30) (11 - 20)  SpO2: 98% (08-31-21 @ 16:30) (98% - 100%)  Wt(kg): --  I&O's Summary    PE: per ED/primary team       LABS:	 	    CBC Full  -  ( 31 Aug 2021 10:37 )  WBC Count : 7.94 K/uL  Hemoglobin : 14.4 g/dL  Hematocrit : 43.5 %  Platelet Count - Automated : 195 K/uL  Mean Cell Volume : 88.4 fl  Mean Cell Hemoglobin : 29.3 pg  Mean Cell Hemoglobin Concentration : 33.1 gm/dL  Auto Neutrophil # : 5.70 K/uL  Auto Lymphocyte # : 0.96 K/uL  Auto Monocyte # : 1.09 K/uL  Auto Eosinophil # : 0.15 K/uL  Auto Basophil # : 0.02 K/uL  Auto Neutrophil % : 71.7 %  Auto Lymphocyte % : 12.1 %  Auto Monocyte % : 13.7 %  Auto Eosinophil % : 1.9 %  Auto Basophil % : 0.3 %    08-31    137  |  99  |  14  ----------------------------<  98  3.5   |  21<L>  |  0.88    Ca    9.8      31 Aug 2021 10:37  Mg     2.1     08-31    TPro  7.5  /  Alb  4.1  /  TBili  0.4  /  DBili  x   /  AST  21  /  ALT  19  /  AlkPhos  72  08-31    Thyroid Stimulating Hormone, Serum (07.29.21 @ 00:21)    Thyroid Stimulating Hormone, Serum: 1.12 uIU/mL      TELEMETRY: pAFib w/ RVR<--> SR, NSVT up to 18 beats  	    ECG: personally reviewed  	    < from: Xray Chest 1 View AP/PA (08.31.21 @ 10:34) >  IMPRESSION:    The heart is normal in size. Lungs areclear. No pleural effusion. No pneumothorax. Degenerative changes of the thoracic spine.    --- End of Report ---    < end of copied text >      Outpatient w/ normal Exercise Treadmill Test from 6/17/2021

## 2021-08-31 NOTE — H&P ADULT - NSICDXPASTMEDICALHX_GEN_ALL_CORE_FT
PAST MEDICAL HISTORY:  Afib     Depression     GERD (gastroesophageal reflux disease)     HTN (hypertension)

## 2021-08-31 NOTE — ED PROVIDER NOTE - NS ED ROS FT
Gen: No F/C/NS  Head: No falls/head trauma  Eyes: No changes in vision   Resp: +cough +trouble breathing  Cardiovascular: No chest pain or palpitation  Gastroenteric: No N/V/D  :  No change in urine output  MS: No joint or muscle pain  Neuro: No headache   Skin: No new rash

## 2021-08-31 NOTE — ED PROVIDER NOTE - PROGRESS NOTE DETAILS
Yazmin Nazario MD (PGY2) -  Pt in Afib with RVR rate 140s, given pushes of metoprolol 5mg x2. Will give AM meds and admit. Yazmin Nazario MD (PGY2) -  Pt in Afib with RVR rate 140s, given pushes of metoprolol 5mg x2. Will give AM meds Yazmin Nazario MD (PGY2) -   Will call patient's E Dr. Quinonez and discuss case Attending MD Riddle : Patient was assessed for possible discharge. Anyat concerned as she is having recurrent episodes of Afib with RVR despite taking her home metoprolol and is having difficulty thriving at home. Objectively, has had two episodes of Afib with RVR here requiring metoprolol pushes despite patient only missed her toprol XL by 1 hour past her usual time. Patient has also been having these episodes at home, and is having trouble getting around her home. Will admit for recurrent episodes of Afib with RVR, likely mediated by COVID.

## 2021-08-31 NOTE — CONSULT NOTE ADULT - ATTENDING COMMENTS
She has paroxysmal AF that has become more prominent with her recent COVID symptoms. Pending course of the COVID illness, best to avoid class III antiarrhythmics as drug interactions can be dangerous if she ends up needing more advanced therapy for COVID. I would leave her on beta blockers and flecainide and watch. Eventually, she will need an ablation for the paroxsymal atrial fibrillation that appear to be breaking through antiarrhythmic therapy.

## 2021-08-31 NOTE — H&P ADULT - NSHPPHYSICALEXAM_GEN_ALL_CORE
pt. seen and examined , NAD     Vital Signs Last 24 Hrs  T(C): 36.8 (01 Sep 2021 17:31), Max: 38.1 (31 Aug 2021 19:55)  T(F): 98.2 (01 Sep 2021 17:31), Max: 100.5 (31 Aug 2021 19:55)  HR: 65 (01 Sep 2021 17:31) (58 - 101)  BP: 121/67 (01 Sep 2021 17:31) (104/66 - 158/81)  BP(mean): 90 (01 Sep 2021 00:00) (90 - 103)  RR: 19 (01 Sep 2021 17:31) (18 - 19)  SpO2: 99% (01 Sep 2021 17:31) (96% - 100%)    heent: nc/at , no pallor  neck: supple, no JVD  Lungs: B/l fair A/E, no w/r/r  heart: s1s2 nml  abd: soft, NABS, NT/Nd  ext: no e/c/c, pulses 1+  neuro: aaox3 , no focal deficit

## 2021-08-31 NOTE — ED ADULT NURSE NOTE - ED STAT RN HANDOFF DETAILS
Report given to SYLVIE Culp. Aware of POC. No questions at this time. Report given to SYLVIE Oviedo. Aware of POC. No questions at this time.

## 2021-08-31 NOTE — CONSULT NOTE ADULT - ASSESSMENT
72y/o F w/ hx WPW s/p ablation (2015 by Dr. Quinonez), pAfib (on apixaban), HTN, HLD, depression and recent ED admission (8/28-8/29) for symptomatic PAF. Patient was supposed to follow up with Dr. Quinonez for Afib ablation as outpatient. She now presents with shortness of breath and fatigue, found to have recurrent pAfib in the setting of COVID19 positive.     1) Symptomatic PAF in the setting of COVID  -Please resume eliquis 5mg BID, 1st dose STAT as patient did not take her am med today  -Continue flecainide 150mg QHS and 100mg QAM  -Continue Toprol XL 100mg QD  -COVID19 management per primary team  -Monitor patient on telemetry  -Supplement to maintain K>4.0, Mg>2.0  -Will follow patient tomorrow  -Discussed with ED provider     CHERRIE Mtz NP-C  [phone]   70y/o F w/ hx WPW s/p ablation (2015 by Dr. Quinonez), pAfib (on apixaban), HTN, HLD, depression and recent ED admission (8/28-8/29) for symptomatic PAF. Patient was supposed to follow up with Dr. Quinonez for Afib ablation as outpatient. She now presents with shortness of breath and fatigue, found to have recurrent pAfib in the setting of COVID19 positive.     1) Symptomatic PAF in the setting of COVID  -Please resume eliquis 5mg BID, 1st dose STAT as patient did not take her am med today  -Continue flecainide 150mg QHS and 100mg QAM  -Continue Toprol XL 100mg QD  -Would consider sotalol but hold off for now given COVID19  -COVID19 management per primary team  -Monitor patient on telemetry  -Supplement to maintain K>4.0, Mg>2.0  -Will follow patient tomorrow  -Discussed with ED provider     CHERRIE Mtz NP-C  [phone]

## 2021-08-31 NOTE — ED PROVIDER NOTE - CLINICAL SUMMARY MEDICAL DECISION MAKING FREE TEXT BOX
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 CTAB, NWOB. Ambulatory sat 97% on RA. 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 Patient is CTAB, and does not have increased work of breathing. Is having some chest pain however, but doubt PE given patient is already anticoagulated for Afib, has ambulatory sat 97% on RA. Plan: basic blood work, CXR, ECG, trop, to eval for ACS vs lobar PNA.

## 2021-08-31 NOTE — ED PROVIDER NOTE - PHYSICAL EXAMINATION
G: NAD, cooperative with exam, anxious   H: NCAT  E: EOMI, no conjunctival pallor   M: Mucous membranes moist   R: CTABL, nWOB  C: Nl S1/S2, no mrg  A: Soft, NT/ND, no rebound/guarding   MSK: no calf tenderness, no LE edema G: NAD, cooperative with exam, anxious   H: NCAT  E: EOMI, no conjunctival pallor   M: Mucous membranes moist   R: CTABL, mild tachypnea, no increased WOB.   C: Nl S1/S2, tachcycaridc, irregularly irregular  A: Soft, NT/ND, no rebound/guarding   MSK: no calf tenderness, no LE edema

## 2021-08-31 NOTE — ED PROVIDER NOTE - OBJECTIVE STATEMENT
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 Tested positive for COVID four days ago. Has been having cough, weakness and generalized malaise for 1 wk.  also positive for COVID. Patient endorses increased stress at home secondary to mother's care. She has not taken any meds this morning. Denies LE swelling, orthopnea, and chest pain. Tmax 100.2 this morning, took Tylenol 1 hr ago. O2 sat went down to 92 earlier today. No sick contacts. No recent travel. 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 Tested positive for COVID four days ago. Has been having cough, weakness and generalized malaise for 1 wk.  also positive for COVID. Patient endorses increased stress at home secondary to mother's care. She has not taken her usual morning meds this morning as she was only due for them at the time of ER arrival. Denies LE swelling, orthopnea, and chest pain. Tmax 100.2 this morning, took Tylenol 1 hr ago. O2 sat went down to 92 earlier today as measured by home pulse ox. No sick contacts. No recent travel.  Patient's main concern is her heart rate. Has a known history of Afib that can be hard to control.Currently she is having multiple episodes daily while having COVID that is leading her to have chest discomfort, lightheadedness and increased fatigue.

## 2021-08-31 NOTE — H&P ADULT - ASSESSMENT
A/p    Covid-19 infection   - her RA o2 sat is >95%  -hold off on remdisivir/ steroids   -she is already on eliquis     # Symptomatic PAF in the setting of COVID  -Please resume eliquis 5mg BID  -Continue flecainide 150mg QHS and 100mg QAM  EP cardio consulted     # HTN   -Continue Toprol XL 100mg QD    advance care planning : d/w tavares regarding advance directive. she remains full code. d/w her about intubation , CPR if the need arise. she agrees for everything. remain full code. time spend 15 min

## 2021-08-31 NOTE — ED ADULT NURSE NOTE - OBJECTIVE STATEMENT
71 y F presents to the ED with SOB. Reports that her  tested positive and she was woken up with CP and SOB. Reports that she didn't need her meds for afib because she didn't feel well. On assessment, A&Ox4. Denies lightheadedness, dizziness, headaches, numbness and tingling. Breathing spontaneously and unlabored on Room air. No Peripheral edema. Cap refill 2s. No JVD. Peripheral pulses strong and equal bilaterally. On cardiac monitor. Abdomen soft, nontender, nondistended, negative CVA tenderness, positive bowel sounds in all four quadrants. Pt is continent. Denies n/v/d, dysuria, melena and hematuria. IV placed 20g in R hand. Labs drawn and sent. Pt safety maintained. Call bell within reach. Side rails in upward position. Pt awaiting dispo.

## 2021-08-31 NOTE — ED PROVIDER NOTE - CARE PLAN
Principal Discharge DX:	Atrial fibrillation   1 Principal Discharge DX:	Atrial fibrillation with rapid ventricular response  Secondary Diagnosis:	2019 novel coronavirus disease (COVID-19)  Secondary Diagnosis:	Shortness of breath

## 2021-09-01 LAB
COVID-19 SPIKE DOMAIN AB INTERP: POSITIVE
COVID-19 SPIKE DOMAIN ANTIBODY RESULT: >250 U/ML — HIGH
HCV AB S/CO SERPL IA: 0.08 S/CO — SIGNIFICANT CHANGE UP (ref 0–0.99)
HCV AB SERPL-IMP: SIGNIFICANT CHANGE UP
SARS-COV-2 IGG+IGM SERPL QL IA: >250 U/ML — HIGH
SARS-COV-2 IGG+IGM SERPL QL IA: POSITIVE

## 2021-09-01 RX ORDER — ACETAMINOPHEN 500 MG
650 TABLET ORAL EVERY 6 HOURS
Refills: 0 | Status: DISCONTINUED | OUTPATIENT
Start: 2021-09-01 | End: 2021-09-03

## 2021-09-01 RX ORDER — BUPROPION HYDROCHLORIDE 150 MG/1
300 TABLET, EXTENDED RELEASE ORAL DAILY
Refills: 0 | Status: DISCONTINUED | OUTPATIENT
Start: 2021-09-01 | End: 2021-09-03

## 2021-09-01 RX ORDER — FLECAINIDE ACETATE 50 MG
100 TABLET ORAL
Refills: 0 | Status: DISCONTINUED | OUTPATIENT
Start: 2021-09-02 | End: 2021-09-03

## 2021-09-01 RX ORDER — FLECAINIDE ACETATE 50 MG
150 TABLET ORAL
Refills: 0 | Status: DISCONTINUED | OUTPATIENT
Start: 2021-09-01 | End: 2021-09-03

## 2021-09-01 RX ORDER — OMEGA-3 ACID ETHYL ESTERS 1 G
2 CAPSULE ORAL
Refills: 0 | Status: DISCONTINUED | OUTPATIENT
Start: 2021-09-01 | End: 2021-09-03

## 2021-09-01 RX ORDER — HYDROCHLOROTHIAZIDE 25 MG
25 TABLET ORAL DAILY
Refills: 0 | Status: DISCONTINUED | OUTPATIENT
Start: 2021-09-01 | End: 2021-09-01

## 2021-09-01 RX ADMIN — Medication 100 MILLIGRAM(S): at 07:01

## 2021-09-01 RX ADMIN — Medication 650 MILLIGRAM(S): at 10:09

## 2021-09-01 RX ADMIN — Medication 2 GRAM(S): at 17:29

## 2021-09-01 RX ADMIN — Medication 1 TABLET(S): at 13:57

## 2021-09-01 RX ADMIN — APIXABAN 5 MILLIGRAM(S): 2.5 TABLET, FILM COATED ORAL at 17:29

## 2021-09-01 RX ADMIN — Medication 100 MILLIGRAM(S): at 21:21

## 2021-09-01 RX ADMIN — Medication 650 MILLIGRAM(S): at 09:07

## 2021-09-01 RX ADMIN — Medication 150 MILLIGRAM(S): at 17:32

## 2021-09-01 RX ADMIN — APIXABAN 5 MILLIGRAM(S): 2.5 TABLET, FILM COATED ORAL at 07:01

## 2021-09-01 RX ADMIN — Medication 150 MILLIGRAM(S): at 00:00

## 2021-09-01 RX ADMIN — Medication 100 MILLIGRAM(S): at 11:41

## 2021-09-01 RX ADMIN — BUPROPION HYDROCHLORIDE 300 MILLIGRAM(S): 150 TABLET, EXTENDED RELEASE ORAL at 09:03

## 2021-09-01 NOTE — PROGRESS NOTE ADULT - ASSESSMENT
A/p    Covid-19 infection   - her RA o2 sat is >95%  -hold off on remdisivir/ steroids   -she is already on eliquis     # Symptomatic PAF in the setting of COVID  -Please resume eliquis 5mg BID  -Continue flecainide 150mg QHS and 100mg QAM  EP cardio consulted : following   genral cardio consulted also : done     # HTN   -Continue Toprol XL 100mg QD    dispo: d/c planning once stable and cleared by EP

## 2021-09-01 NOTE — PROGRESS NOTE ADULT - ASSESSMENT
70 y/o F w/ hx WPW s/p ablation (2015 by Dr. Quinonez), pAfib (on apixaban), HTN, HLD, depression and recent ED admission (8/28-8/29) for symptomatic PAF. Patient was supposed to follow up with Dr. Quinnoez for Afib ablation as outpatient. She now presents with shortness of breath and fatigue, found to have recurrent pAfib in the setting of COVID19 positive.     1) Symptomatic PAF in the setting of COVID    - Patient converted to sinus rhythm around midnight, rate controlled  - Continue AC with Eliquis 5mg BID  - Flecainide 150mg QHS and 100mg QAM resumed this morning  - Continue Toprol XL 100mg QD  - Would consider sotalol but hold off for now given COVID19  - COVID19 management per primary team; continue telemetry monitoring  - Supplement to maintain K>4.0, Mg>2.0    Liana Azevedo, ANP-C  #[id number] 70 y/o F w/ hx WPW s/p ablation (2015 by Dr. Quinonez), pAfib (on apixaban), HTN, HLD, depression and recent ED admission (8/28-8/29) for symptomatic PAF. Patient was supposed to follow up with Dr. Quinonez for Afib ablation as outpatient. She now presents with shortness of breath and fatigue, found to have recurrent pAfib in the setting of COVID19 positive.     1) Symptomatic PAF in the setting of COVID    - Patient converted to sinus rhythm around midnight, rate controlled  - Continue AC with Eliquis 5mg BID  - Flecainide 150mg QHS and 100mg QAM resumed this morning  - Continue Toprol XL 100mg QD  - Would consider sotalol but hold off for now given COVID19  - COVID19 management per primary team; continue telemetry monitoring  - Supplement to maintain K>4.0, Mg>2.0    JESUS Thompson  #[id number]    Addendum:  Case d/w EP attdg  Recurrent pAF in the setting of covid positive but maintaining sinus rhythm today  Continue current medical management including Flecainide, Metoprolol, and Eliquis  Follow up as outpatient with Dr Vikram Quinonez ([facility]) to discuss ablation in future  Will sign off, please reconsult as needed    SASKIA ThompsonC  #[id number]

## 2021-09-01 NOTE — CONSULT NOTE ADULT - SUBJECTIVE AND OBJECTIVE BOX
71 year old female with a history of HTN, elevated cholesterol and S/P WPW ablation in 2015 ( [facility] ) who has since had PAF and was treated with Multaq in the past but was recently switched to Flecainide.  She is now admitted for increasing dyspnea and rapid AF with testing positive for Covid.  Since admission she has been in and out of AF.  She has no history of CAD.  Her most recent nuclear stress was 6/17/20 and revealed normal perfusion  Her most recent Echo 3/26/21 revealed an EF 63%  moderate-severely dilated LA  trace AI  mild MR  mild TR with an est PA 42 mm Hg    Out patient medications:  Lovaza 1 gram bid                                        Metoprolol 100 mg qd                                        HCTZ 25 mg qd                                        Wellbutrin 300 mg qd                                        Eliquis 5 mg bid    /66  HR 56  Temp 99.8  O2 sat 96% RA  Exam as per primary team     EKG: NSR  WNL    CXR:  normal    CBC  normal  CMP normal other than a CO2  of 21    Imp:  PAF - She was scheduled for ablation prior to admission.  The Covid infection has clearly exacerbated the PAF.  She has been switched from Multaq to Flecainide 150 mg AM and 100 mg PM ( ?? by EP team )  Rec:  Continue Eliquis and Flecainide as per EP  Unclear whether Multaq will have better efficacy in this setting  Treatment of Covid as per the primary team

## 2021-09-02 LAB
ANION GAP SERPL CALC-SCNC: 12 MMOL/L — SIGNIFICANT CHANGE UP (ref 5–17)
BUN SERPL-MCNC: 13 MG/DL — SIGNIFICANT CHANGE UP (ref 7–23)
CALCIUM SERPL-MCNC: 8.5 MG/DL — SIGNIFICANT CHANGE UP (ref 8.4–10.5)
CHLORIDE SERPL-SCNC: 106 MMOL/L — SIGNIFICANT CHANGE UP (ref 96–108)
CO2 SERPL-SCNC: 22 MMOL/L — SIGNIFICANT CHANGE UP (ref 22–31)
CREAT SERPL-MCNC: 0.74 MG/DL — SIGNIFICANT CHANGE UP (ref 0.5–1.3)
D DIMER BLD IA.RAPID-MCNC: <150 NG/ML DDU — SIGNIFICANT CHANGE UP
GLUCOSE SERPL-MCNC: 80 MG/DL — SIGNIFICANT CHANGE UP (ref 70–99)
MAGNESIUM SERPL-MCNC: 2.2 MG/DL — SIGNIFICANT CHANGE UP (ref 1.6–2.6)
PHOSPHATE SERPL-MCNC: 1.9 MG/DL — LOW (ref 2.5–4.5)
POTASSIUM SERPL-MCNC: 3.7 MMOL/L — SIGNIFICANT CHANGE UP (ref 3.5–5.3)
POTASSIUM SERPL-SCNC: 3.7 MMOL/L — SIGNIFICANT CHANGE UP (ref 3.5–5.3)
SODIUM SERPL-SCNC: 140 MMOL/L — SIGNIFICANT CHANGE UP (ref 135–145)

## 2021-09-02 RX ORDER — SODIUM,POTASSIUM PHOSPHATES 278-250MG
1 POWDER IN PACKET (EA) ORAL ONCE
Refills: 0 | Status: COMPLETED | OUTPATIENT
Start: 2021-09-02 | End: 2021-09-02

## 2021-09-02 RX ORDER — POTASSIUM CHLORIDE 20 MEQ
40 PACKET (EA) ORAL ONCE
Refills: 0 | Status: COMPLETED | OUTPATIENT
Start: 2021-09-02 | End: 2021-09-02

## 2021-09-02 RX ORDER — METOPROLOL TARTRATE 50 MG
75 TABLET ORAL
Refills: 0 | Status: DISCONTINUED | OUTPATIENT
Start: 2021-09-02 | End: 2021-09-03

## 2021-09-02 RX ORDER — METOPROLOL TARTRATE 50 MG
75 TABLET ORAL
Refills: 0 | Status: DISCONTINUED | OUTPATIENT
Start: 2021-09-02 | End: 2021-09-02

## 2021-09-02 RX ADMIN — Medication 2 GRAM(S): at 05:15

## 2021-09-02 RX ADMIN — Medication 1 PACKET(S): at 12:25

## 2021-09-02 RX ADMIN — Medication 100 MILLIGRAM(S): at 13:44

## 2021-09-02 RX ADMIN — Medication 100 MILLIGRAM(S): at 05:15

## 2021-09-02 RX ADMIN — Medication 2 GRAM(S): at 17:33

## 2021-09-02 RX ADMIN — Medication 100 MILLIGRAM(S): at 21:09

## 2021-09-02 RX ADMIN — APIXABAN 5 MILLIGRAM(S): 2.5 TABLET, FILM COATED ORAL at 05:14

## 2021-09-02 RX ADMIN — Medication 1 TABLET(S): at 10:11

## 2021-09-02 RX ADMIN — BUPROPION HYDROCHLORIDE 300 MILLIGRAM(S): 150 TABLET, EXTENDED RELEASE ORAL at 08:25

## 2021-09-02 RX ADMIN — APIXABAN 5 MILLIGRAM(S): 2.5 TABLET, FILM COATED ORAL at 17:33

## 2021-09-02 RX ADMIN — Medication 150 MILLIGRAM(S): at 17:33

## 2021-09-02 RX ADMIN — Medication 40 MILLIEQUIVALENT(S): at 15:22

## 2021-09-02 NOTE — CHART NOTE - NSCHARTNOTEFT_GEN_A_CORE
Case discussed with EP as pt still having overnight episodes of paroxysmal AFib up to 130s and had 7 beats WCT.   Overnight, Metoprolol given early ~3AM.   As per EP, will need an ablation once COVID infection resolved.   Pt states Dr. Quinonez too far, will refer to Dr. Meek Varma.   Will monitor on telemetry overnight.   Will change Metoprolol succinate to 75mg BID with hold parameters.   Continue with Flecainide and Eliquis.   Will check TTE as well.   K and Mg repleted to maintain K >4 and Mg >2.0.     Discussed with Dr. Handley and Dr. Victoria.     YOKASTA Ni   Dept of Medicine   [phone]

## 2021-09-02 NOTE — CHART NOTE - NSCHARTNOTEFT_GEN_A_CORE
Chart Note:  Electrophysiology    72 y/o F w/ hx WPW s/p ablation (2015 by Dr. Quinonez), pAfib (on apixaban), HTN, HLD, depression and recent ED admission (8/28-8/29) for symptomatic PAF. Patient was supposed to follow up with Dr. Quinonez for Afib ablation as outpatient. She presented with shortness of breath and fatigue, found to have recurrent pAfib in the setting of COVID19 positive. EPS initially consulted for recurrent symptomatic pAF. EPS recalled back on 9/2 for 7 beats of WCT overnight.    MEDICATIONS:  flecainide 100 milliGRAM(s) Oral <User Schedule>  flecainide 150 milliGRAM(s) Oral <User Schedule>  metoprolol succinate  milliGRAM(s) Oral daily  benzonatate 100 milliGRAM(s) Oral three times a day  acetaminophen   Tablet .. 650 milliGRAM(s) Oral every 6 hours PRN  buPROPion XL (24-Hour) . 300 milliGRAM(s) Oral daily  apixaban 5 milliGRAM(s) Oral every 12 hours  multivitamin 1 Tablet(s) Oral daily    T(C): 37.1 (09-02-21 @ 04:42), Max: 37.7 (09-01-21 @ 09:05)  HR: 70 (09-02-21 @ 04:42) (58 - 70)  BP: 115/78 (09-02-21 @ 04:42) (107/66 - 126/67)  RR: 18 (09-02-21 @ 04:42) (18 - 19)  SpO2: 94% (09-02-21 @ 04:42) (94% - 99%)  Wt(kg): --  I&O's Summary    01 Sep 2021 07:01  -  02 Sep 2021 07:00  --------------------------------------------------------  IN: 1200 mL / OUT: 0 mL / NET: 1200 mL    Remainder of Exam per primary team    TELEMETRY: NSR with intermittent  pAF 60-70's,	7 beats WCT overnight    1) Symptomatic PAF in the setting of COVID  2 7 beats of WCT    - Review of telemetry overnight with paroxysmal Afib and NSR, had 7 beats of NSVT  - Continue AC with Eliquis 5mg BID  - Continue Flecainide home dose  - Continue Metoprolol Succinate 75mg bid  - Please obtain Echo to re-evaluate LVEF, structural heart  - COVID19 management per primary team; continue telemetry monitoring  - Maintain K>4.0, Mg>2.0, supplement as needed  - Follow up with Dr Varma as OP in 2 wks to discuss ablation  - Above d/w primary team    Liana Azevedo, ANP-C  #[phone]

## 2021-09-02 NOTE — DISCHARGE NOTE PROVIDER - HOSPITAL COURSE
70 y/o F with PMHx of WPW s/p ablation 1993, pAfib on apixaban, HTN, HLD, and depression who presents with SOB. Tested positive for COVID four days ago. Has been having cough, weakness and generalized malaise for 1 wk.  also positive for COVID. Patient endorses increased stress at home secondary to mother's care. She has not taken her usual morning meds this morning as she was only due for them at the time of ER arrival. Denies LE swelling, orthopnea, and chest pain. Tmax 100.2 this morning, took Tylenol 1 hr ago. O2 sat went down to 92 earlier today as measured by home pulse ox. No sick contacts. No recent travel.  Patient's main concern is her heart rate. Has a known history of Afib that can be hard to control. Currently she is having multiple episodes daily while having COVID that is leading her to have chest discomfort, lightheadedness and increased fatigue.     EP Consulted. Patient converted to sinus rhythm around midnight, rate controlled  - Continue AC with Eliquis 5mg BID  - Flecainide 150mg QHS and 100mg QAM resumed this morning  - Continue Toprol XL 100mg QD  - Would consider sotalol but hold off for now given COVID19. Will need an ablation, but instructed to follow up as outpatient.     COVID status, remained asymptomatic on RA.   Pt medically stable for discharge home and f/u EP physician.

## 2021-09-02 NOTE — CHART NOTE - NSCHARTNOTEFT_GEN_A_CORE
Episodic PA Medicine Note    Notified by RN, patient had 7 beats of wide complex tachycardia on telemetry. Patient seen and examined at bedside, A&Ox3 in no acute distress. Patient denies chest pain, palpitations, or shortness of breath.     Vital Signs Last 24 Hrs  T(C): 37.1 (02 Sep 2021 04:42), Max: 37.7 (01 Sep 2021 09:05)  T(F): 98.7 (02 Sep 2021 04:42), Max: 99.8 (01 Sep 2021 09:05)  HR: 70 (02 Sep 2021 04:42) (58 - 70)  BP: 115/78 (02 Sep 2021 04:42) (107/66 - 126/67)  BP(mean): --  RR: 18 (02 Sep 2021 04:42) (18 - 19)  SpO2: 94% (02 Sep 2021 04:42) (94% - 99%)      Labs:                          14.4   7.94  )-----------( 195      ( 31 Aug 2021 10:37 )             43.5     08-31    137  |  99  |  14  ----------------------------<  98  3.5   |  21<L>  |  0.88    Ca    9.8      31 Aug 2021 10:37  Mg     2.1     08-31    TPro  7.5  /  Alb  4.1  /  TBili  0.4  /  DBili  x   /  AST  21  /  ALT  19  /  AlkPhos  72  08-31            Assessment & Plan:  HPI:  71F with history of WPW s/p ablation 1993, pAfib on apixaban, HTN, HLD, and depression who is admitted for testing positive for COVID. (31 Aug 2021 16:28)    #7 beats of wide complex tachycardia   Patient is hemodynamically stable.   - Baseline rhythm atrial fibrillation HR: 105 on telemetry  - Metoprolol   - Electrolytes stat   - Keep K > 4, Mg > 2, Phos > 3  - Will closely monitor on telemetry  - Continue to monitor vital signs   - Will endorse to primary medicine team in YOKASTA Stoll  Department of Medicine Episodic PA Medicine Note      Notified by RN, patient had 7 beats of WCT on telemetry. Patient asymptomatic, denies chest pain, palpitations, or shortness of breath.     Vital Signs Last 24 Hrs  T(C): 37.1 (02 Sep 2021 04:42), Max: 37.7 (01 Sep 2021 09:05)  T(F): 98.7 (02 Sep 2021 04:42), Max: 99.8 (01 Sep 2021 09:05)  HR: 70 (02 Sep 2021 04:42) (58 - 70)  BP: 115/78 (02 Sep 2021 04:42) (107/66 - 126/67)  BP(mean): --  RR: 18 (02 Sep 2021 04:42) (18 - 19)  SpO2: 94% (02 Sep 2021 04:42) (94% - 99%)      Labs:                          14.4   7.94  )-----------( 195      ( 31 Aug 2021 10:37 )             43.5     08-31    137  |  99  |  14  ----------------------------<  98  3.5   |  21<L>  |  0.88    Ca    9.8      31 Aug 2021 10:37  Mg     2.1     08-31    TPro  7.5  /  Alb  4.1  /  TBili  0.4  /  DBili  x   /  AST  21  /  ALT  19  /  AlkPhos  72  08-31      Assessment & Plan:  HPI: 70 yo F with history of WPW s/p ablation 1993, pAfib on apixaban, HTN, HLD, and depression who is admitted for testing positive for COVID.   Now with 7 beats of WCT on telemetry     # WCT  Patient is hemodynamically stable.   - Baseline rhythm atrial fibrillation HR: 105 on telemetry  - Continue metoprolol succinate 100mg qd and flecainide 100mg qd   - Follow up AM labs, BMP, Magnesium, Phosphorus  - Keep K > 4, Mg > 2, Phos > 3  - Continue telemetry  - Monitor vital signs  -Cardiology following   - Will endorse to primary team in AM, attending to follow   YOKASTA Beltran  Department of Medicine

## 2021-09-02 NOTE — DISCHARGE NOTE PROVIDER - PROVIDER TOKENS
PROVIDER:[TOKEN:[[id number]],FOLLOWUP:[1 week]],PROVIDER:[TOKEN:[[id number]],FOLLOWUP:[1 week]],PROVIDER:[TOKEN:[[id number]],FOLLOWUP:[2 weeks]]

## 2021-09-02 NOTE — DISCHARGE NOTE PROVIDER - CARE PROVIDERS DIRECT ADDRESSES
,[email or web address]

## 2021-09-02 NOTE — PROGRESS NOTE ADULT - ASSESSMENT
71 year HF with a history of HTN, elevated cholesterol and S/P WPW ablation in 2015 ( Devi) who has since had PAF and was treated with Multaq in the past but was recently switched to Flecainide.  She is now admitted for increasing dyspnea and rapid AF with testing positive for Covid.  Since admission she has been in and out of AF.  She has no history of CAD.  Her most recent nuclear stress was 6/17/20 and revealed normal perfusion  Her most recent Echo 3/26/21 revealed an EF 63%  moderate-severely dilated LA  trace AI  mild MR  mild TR with an est PA 42 mm Hg. Patient on tele had 7 beats WCT, HR up to 174.  - cont flecainide  - cont Metoprolol  - Cont Eliquis for thromboembolic prophylaxis  - Maintain LK>4 Mg > 2 Phos > 2.5  - will recall EPS for f/u of WCT  - cont present care per primary team

## 2021-09-02 NOTE — DISCHARGE NOTE PROVIDER - CARE PROVIDER_API CALL
Vikram Quinonez)  Cardiac Electrophysiology; Cardiology; Internal Medicine  [address] [place] [id number]  Phone: [phone]  Fax: [phone]  Follow Up Time: 1 week    Meek Varma)  Cardiac Electrophysiology; Cardiovascular Disease; Internal Medicine  [address] [place] [address]  Phone: [phone]  Fax: [phone]  Follow Up Time: 1 week    Kenji Patiño)  Cardiovascular Disease; Internal Medicine  [address] [place] [address], [address]  [place], [place] [id number]  Phone: [phone]  Fax: [phone]  Follow Up Time: 2 weeks

## 2021-09-02 NOTE — DISCHARGE NOTE PROVIDER - NSDCMRMEDTOKEN_GEN_ALL_CORE_FT
cholecalciferol oral tablet:   Eliquis 5 mg oral tablet: 1 tab(s) orally 2 times a day  epinastine 0.05% ophthalmic solution: 1 drop(s) to each affected eye , As Needed - for allergy symptoms  flecainide 100 mg oral tablet: 1.5 tab(s) (150mg) in the morning  1 tab (100mg) in the everning  hydroCHLOROthiazide 25 mg oral tablet: 1 tab(s) orally once a day  Lovaza 1000 mg oral capsule: 2 cap(s) orally 2 times a day  Multiple Vitamins oral tablet: 1 tab(s) orally once a day  PreserVision AREDS oral capsule: cap(s) orally  Restasis 0.05% ophthalmic emulsion: 1 drop(s) to each affected eye 2 times a day  Toprol- mg oral tablet, extended release: 1 tab(s) orally once a day  Tylenol Extra Strength 500 mg oral tablet: 2 tab(s) orally , As Needed  Ubrelvy 100 mg oral tablet: 1 tab(s) orally once a day, As Needed  Vitamin C:   Wellbutrin  mg/24 hours oral tablet, extended release: 1 tab(s) orally once a day   acetaminophen 325 mg oral tablet: 2 tab(s) orally every 6 hours, As needed, Temp greater or equal to 38C (100.4F), Mild Pain (1 - 3)  apixaban 5 mg oral tablet: 1 tab(s) orally every 12 hours  benzonatate 100 mg oral capsule: 1 cap(s) orally 3 times a day  as needed cough  buPROPion 300 mg/24 hours (XL) oral tablet, extended release: 1 tab(s) orally once a day  cholecalciferol oral tablet:   epinastine 0.05% ophthalmic solution: 1 drop(s) to each affected eye , As Needed - for allergy symptoms  flecainide 100 mg oral tablet: 1.5 tab(s) (150mg) in the morning  1 tab (100mg) in the everning  metoprolol succinate 25 mg oral tablet, extended release: 3 tab(s) orally 2 times a day  Multiple Vitamins oral tablet: 1 tab(s) orally once a day  omega-3 polyunsaturated fatty acids ethyl esters 1000 mg oral capsule: 2 cap(s) orally 2 times a day  PreserVision AREDS oral capsule: cap(s) orally  Restasis 0.05% ophthalmic emulsion: 1 drop(s) to each affected eye 2 times a day  Ubrelvy 100 mg oral tablet: 1 tab(s) orally once a day, As Needed  Vitamin C:    acetaminophen 325 mg oral tablet: 2 tab(s) orally every 6 hours, As needed, Temp greater or equal to 38C (100.4F), Mild Pain (1 - 3)  apixaban 5 mg oral tablet: 1 tab(s) orally every 12 hours  benzonatate 100 mg oral capsule: 1 cap(s) orally 3 times a day  as needed cough  buPROPion 300 mg/24 hours (XL) oral tablet, extended release: 1 tab(s) orally once a day  cholecalciferol oral tablet:   epinastine 0.05% ophthalmic solution: 1 drop(s) to each affected eye , As Needed - for allergy symptoms  flecainide 100 mg oral tablet: 1.5 tab(s) (150mg) in the morning  1 tab (100mg) in the everning  flecainide 100 mg oral tablet: 1 tab(s) orally  @ 6am  per EP  metoprolol succinate 25 mg oral tablet, extended release: 3 tab(s) orally 2 times a day  Multiple Vitamins oral tablet: 1 tab(s) orally once a day  omega-3 polyunsaturated fatty acids ethyl esters 1000 mg oral capsule: 2 cap(s) orally 2 times a day  PreserVision AREDS oral capsule: cap(s) orally  Restasis 0.05% ophthalmic emulsion: 1 drop(s) to each affected eye 2 times a day  Vitamin C:

## 2021-09-03 ENCOUNTER — TRANSCRIPTION ENCOUNTER (OUTPATIENT)
Age: 71
End: 2021-09-03

## 2021-09-03 VITALS — SYSTOLIC BLOOD PRESSURE: 129 MMHG | OXYGEN SATURATION: 98 % | HEART RATE: 63 BPM | DIASTOLIC BLOOD PRESSURE: 67 MMHG

## 2021-09-03 LAB
ALBUMIN SERPL ELPH-MCNC: 3.5 G/DL — SIGNIFICANT CHANGE UP (ref 3.3–5)
ALP SERPL-CCNC: 69 U/L — SIGNIFICANT CHANGE UP (ref 40–120)
ALT FLD-CCNC: 24 U/L — SIGNIFICANT CHANGE UP (ref 10–45)
ANION GAP SERPL CALC-SCNC: 11 MMOL/L — SIGNIFICANT CHANGE UP (ref 5–17)
AST SERPL-CCNC: 30 U/L — SIGNIFICANT CHANGE UP (ref 10–40)
BILIRUB SERPL-MCNC: 0.2 MG/DL — SIGNIFICANT CHANGE UP (ref 0.2–1.2)
BUN SERPL-MCNC: 13 MG/DL — SIGNIFICANT CHANGE UP (ref 7–23)
CALCIUM SERPL-MCNC: 8.8 MG/DL — SIGNIFICANT CHANGE UP (ref 8.4–10.5)
CHLORIDE SERPL-SCNC: 105 MMOL/L — SIGNIFICANT CHANGE UP (ref 96–108)
CO2 SERPL-SCNC: 21 MMOL/L — LOW (ref 22–31)
CREAT SERPL-MCNC: 0.7 MG/DL — SIGNIFICANT CHANGE UP (ref 0.5–1.3)
GLUCOSE SERPL-MCNC: 92 MG/DL — SIGNIFICANT CHANGE UP (ref 70–99)
HCT VFR BLD CALC: 41.6 % — SIGNIFICANT CHANGE UP (ref 34.5–45)
HGB BLD-MCNC: 13.4 G/DL — SIGNIFICANT CHANGE UP (ref 11.5–15.5)
MAGNESIUM SERPL-MCNC: 2.4 MG/DL — SIGNIFICANT CHANGE UP (ref 1.6–2.6)
MCHC RBC-ENTMCNC: 29.2 PG — SIGNIFICANT CHANGE UP (ref 27–34)
MCHC RBC-ENTMCNC: 32.2 GM/DL — SIGNIFICANT CHANGE UP (ref 32–36)
MCV RBC AUTO: 90.6 FL — SIGNIFICANT CHANGE UP (ref 80–100)
NRBC # BLD: 0 /100 WBCS — SIGNIFICANT CHANGE UP (ref 0–0)
PHOSPHATE SERPL-MCNC: 2.4 MG/DL — LOW (ref 2.5–4.5)
PLATELET # BLD AUTO: 185 K/UL — SIGNIFICANT CHANGE UP (ref 150–400)
POTASSIUM SERPL-MCNC: 4.1 MMOL/L — SIGNIFICANT CHANGE UP (ref 3.5–5.3)
POTASSIUM SERPL-SCNC: 4.1 MMOL/L — SIGNIFICANT CHANGE UP (ref 3.5–5.3)
PROT SERPL-MCNC: 6.9 G/DL — SIGNIFICANT CHANGE UP (ref 6–8.3)
RBC # BLD: 4.59 M/UL — SIGNIFICANT CHANGE UP (ref 3.8–5.2)
RBC # FLD: 13.5 % — SIGNIFICANT CHANGE UP (ref 10.3–14.5)
SODIUM SERPL-SCNC: 137 MMOL/L — SIGNIFICANT CHANGE UP (ref 135–145)
WBC # BLD: 4.57 K/UL — SIGNIFICANT CHANGE UP (ref 3.8–10.5)
WBC # FLD AUTO: 4.57 K/UL — SIGNIFICANT CHANGE UP (ref 3.8–10.5)

## 2021-09-03 PROCEDURE — 80053 COMPREHEN METABOLIC PANEL: CPT

## 2021-09-03 PROCEDURE — 71045 X-RAY EXAM CHEST 1 VIEW: CPT

## 2021-09-03 PROCEDURE — 85027 COMPLETE CBC AUTOMATED: CPT

## 2021-09-03 PROCEDURE — 96374 THER/PROPH/DIAG INJ IV PUSH: CPT

## 2021-09-03 PROCEDURE — 94640 AIRWAY INHALATION TREATMENT: CPT

## 2021-09-03 PROCEDURE — 83735 ASSAY OF MAGNESIUM: CPT

## 2021-09-03 PROCEDURE — 84484 ASSAY OF TROPONIN QUANT: CPT

## 2021-09-03 PROCEDURE — 86769 SARS-COV-2 COVID-19 ANTIBODY: CPT

## 2021-09-03 PROCEDURE — 84100 ASSAY OF PHOSPHORUS: CPT

## 2021-09-03 PROCEDURE — 85379 FIBRIN DEGRADATION QUANT: CPT

## 2021-09-03 PROCEDURE — 85025 COMPLETE CBC W/AUTO DIFF WBC: CPT

## 2021-09-03 PROCEDURE — U0003: CPT

## 2021-09-03 PROCEDURE — 99291 CRITICAL CARE FIRST HOUR: CPT | Mod: 25

## 2021-09-03 PROCEDURE — 86803 HEPATITIS C AB TEST: CPT

## 2021-09-03 PROCEDURE — U0005: CPT

## 2021-09-03 PROCEDURE — 93306 TTE W/DOPPLER COMPLETE: CPT

## 2021-09-03 PROCEDURE — 93306 TTE W/DOPPLER COMPLETE: CPT | Mod: 26

## 2021-09-03 PROCEDURE — 80048 BASIC METABOLIC PNL TOTAL CA: CPT

## 2021-09-03 RX ORDER — METOPROLOL TARTRATE 50 MG
0 TABLET ORAL
Qty: 0 | Refills: 0 | DISCHARGE
Start: 2021-09-03 | End: 2021-10-02

## 2021-09-03 RX ORDER — APIXABAN 2.5 MG/1
1 TABLET, FILM COATED ORAL
Qty: 0 | Refills: 0 | DISCHARGE

## 2021-09-03 RX ORDER — ACETAMINOPHEN 500 MG
2 TABLET ORAL
Qty: 0 | Refills: 0 | DISCHARGE

## 2021-09-03 RX ORDER — ACETAMINOPHEN 500 MG
2 TABLET ORAL
Qty: 0 | Refills: 0 | DISCHARGE
Start: 2021-09-03

## 2021-09-03 RX ORDER — UBROGEPANT 100 MG/1
1 TABLET ORAL
Qty: 0 | Refills: 0 | DISCHARGE

## 2021-09-03 RX ORDER — APIXABAN 2.5 MG/1
1 TABLET, FILM COATED ORAL
Qty: 0 | Refills: 0 | DISCHARGE
Start: 2021-09-03

## 2021-09-03 RX ORDER — OMEGA-3 ACID ETHYL ESTERS 1 G
2 CAPSULE ORAL
Qty: 0 | Refills: 0 | DISCHARGE
Start: 2021-09-03

## 2021-09-03 RX ORDER — METOPROLOL TARTRATE 50 MG
1 TABLET ORAL
Qty: 0 | Refills: 0 | DISCHARGE

## 2021-09-03 RX ORDER — BUPROPION HYDROCHLORIDE 150 MG/1
1 TABLET, EXTENDED RELEASE ORAL
Qty: 0 | Refills: 0 | DISCHARGE
Start: 2021-09-03

## 2021-09-03 RX ORDER — FLECAINIDE ACETATE 50 MG
1 TABLET ORAL
Qty: 0 | Refills: 0 | DISCHARGE
Start: 2021-09-03

## 2021-09-03 RX ORDER — BUPROPION HYDROCHLORIDE 150 MG/1
1 TABLET, EXTENDED RELEASE ORAL
Qty: 0 | Refills: 0 | DISCHARGE

## 2021-09-03 RX ORDER — METOPROLOL TARTRATE 50 MG
3 TABLET ORAL
Qty: 180 | Refills: 0
Start: 2021-09-03 | End: 2021-10-02

## 2021-09-03 RX ORDER — OMEGA-3 ACID ETHYL ESTERS 1 G
2 CAPSULE ORAL
Qty: 0 | Refills: 0 | DISCHARGE

## 2021-09-03 RX ADMIN — Medication 2 GRAM(S): at 05:17

## 2021-09-03 RX ADMIN — Medication 100 MILLIGRAM(S): at 05:17

## 2021-09-03 RX ADMIN — APIXABAN 5 MILLIGRAM(S): 2.5 TABLET, FILM COATED ORAL at 17:53

## 2021-09-03 RX ADMIN — Medication 1 TABLET(S): at 12:32

## 2021-09-03 RX ADMIN — Medication 75 MILLIGRAM(S): at 05:16

## 2021-09-03 RX ADMIN — Medication 2 GRAM(S): at 17:53

## 2021-09-03 RX ADMIN — BUPROPION HYDROCHLORIDE 300 MILLIGRAM(S): 150 TABLET, EXTENDED RELEASE ORAL at 12:32

## 2021-09-03 RX ADMIN — Medication 150 MILLIGRAM(S): at 17:53

## 2021-09-03 RX ADMIN — Medication 75 MILLIGRAM(S): at 17:53

## 2021-09-03 RX ADMIN — APIXABAN 5 MILLIGRAM(S): 2.5 TABLET, FILM COATED ORAL at 05:17

## 2021-09-03 RX ADMIN — Medication 100 MILLIGRAM(S): at 14:19

## 2021-09-03 NOTE — PROGRESS NOTE ADULT - ASSESSMENT
72 y/o F w/ hx WPW s/p ablation (2015 by Dr. Quinonez), pAfib (on apixaban), HTN, HLD, depression and recent ED admission (8/28-8/29) for symptomatic PAF. Patient was supposed to follow up with Dr. Quinonez for Afib ablation as outpatient. She presented with shortness of breath and fatigue, found to have recurrent pAfib in the setting of COVID19 positive. EPS initially consulted for recurrent symptomatic pAF. EPS recalled back on 9/2 for 7 beats of WCT overnight.    1) Symptomatic PAF in the setting of COVID  2 7 beats of WCT    - Review of telemetry overnight and this morning, remain in SB/SR, no Afib or WCT noted   - Continue AC with Eliquis 5mg BID  - Continue Flecainide home dose 150mg QHS and 100mg QAM  - Continue Metoprolol Succinate 75mg bid  - Pending Echo to re-evaluate LVEF, structural heart  - COVID19 management per primary team; continue telemetry monitoring  - Maintain K>4.0, Mg>2.0, supplement as needed  - Follow up with Dr Quinonez as OP in 2 wks to discuss ablation  - Discharge planning per primary team    CHERRIE Mtz, NP-C  [id number]

## 2021-09-03 NOTE — CHART NOTE - NSCHARTNOTEFT_GEN_A_CORE
Medicine NP ([id number])    notified by RN @ AM rounds pt. requests to speak with medical attending.  Pt stated already spoke with NP yesterday; wants conversation with MD per RN.  Await echo to be performed/resulted per EP prior discharge  EP wrote note today; signed off. Pt to F/U in 2 weeks with EP Attending to discuss AF Ablation    1830; NP spoke with Dr. SLICK Victoria re. echo results/discharge.  Pt.cleared for d/c now; MD will arrive @ [facility] later  this evening.

## 2021-09-03 NOTE — PROGRESS NOTE ADULT - SUBJECTIVE AND OBJECTIVE BOX
Patient is a 71y old  Female who presents with a chief complaint of Covid (01 Sep 2021 11:54)      INTERVAL HPI/OVERNIGHT EVENTS: seen and examined, HR controlled   T(C): 37.3 (09-01-21 @ 21:01), Max: 37.7 (09-01-21 @ 09:05)  HR: 65 (09-01-21 @ 21:01) (58 - 65)  BP: 126/67 (09-01-21 @ 21:01) (104/66 - 126/67)  RR: 19 (09-01-21 @ 21:01) (18 - 19)  SpO2: 98% (09-01-21 @ 21:01) (96% - 99%)  Wt(kg): --  I&O's Summary    01 Sep 2021 07:01  -  02 Sep 2021 03:01  --------------------------------------------------------  IN: 1200 mL / OUT: 0 mL / NET: 1200 mL        PAST MEDICAL & SURGICAL HISTORY:  HTN (hypertension)    Depression    GERD (gastroesophageal reflux disease)    Afib    History of hysterectomy        SOCIAL HISTORY  Alcohol:  Tobacco:  Illicit substance use:    FAMILY HISTORY:    REVIEW OF SYSTEMS:  CONSTITUTIONAL: No fever, weight loss, or fatigue  EYES: No eye pain, visual disturbances, or discharge  ENMT:  No difficulty hearing, tinnitus, vertigo; No sinus or throat pain  NECK: No pain or stiffness  RESPIRATORY: No cough, wheezing, chills or hemoptysis; No shortness of breath  CARDIOVASCULAR: No chest pain, palpitations, dizziness, or leg swelling  GASTROINTESTINAL: No abdominal or epigastric pain. No nausea, vomiting, or hematemesis; No diarrhea or constipation. No melena or hematochezia.  GENITOURINARY: No dysuria, frequency, hematuria, or incontinence  NEUROLOGICAL: No headaches, memory loss, loss of strength, numbness, or tremors  SKIN: No itching, burning, rashes, or lesions   LYMPH NODES: No enlarged glands  ENDOCRINE: No heat or cold intolerance; No hair loss  MUSCULOSKELETAL: No joint pain or swelling; No muscle, back, or extremity pain  PSYCHIATRIC: No depression, anxiety, mood swings, or difficulty sleeping  HEME/LYMPH: No easy bruising, or bleeding gums  ALLERY AND IMMUNOLOGIC: No hives or eczema    RADIOLOGY & ADDITIONAL TESTS:    Imaging Personally Reviewed:  [ ] YES  [ ] NO    Consultant(s) Notes Reviewed:  [ ] YES  [ ] NO    PHYSICAL EXAM:  GENERAL: NAD, well-groomed, well-developed  HEAD:  Atraumatic, Normocephalic  EYES: EOMI, PERRLA, conjunctiva and sclera clear  ENMT: No tonsillar erythema, exudates, or enlargement; Moist mucous membranes, Good dentition, No lesions  NECK: Supple, No JVD, Normal thyroid  NERVOUS SYSTEM:  Alert & Oriented X3, Good concentration; Motor Strength 5/5 B/L upper and lower extremities; DTRs 2+ intact and symmetric  CHEST/LUNG: Clear to percussion bilaterally; No rales, rhonchi, wheezing, or rubs  HEART: Regular rate and rhythm; No murmurs, rubs, or gallops  ABDOMEN: Soft, Nontender, Nondistended; Bowel sounds present  EXTREMITIES:  2+ Peripheral Pulses, No clubbing, cyanosis, or edema  LYMPH: No lymphadenopathy noted  SKIN: No rashes or lesions    LABS:                        14.4   7.94  )-----------( 195      ( 31 Aug 2021 10:37 )             43.5     08-31    137  |  99  |  14  ----------------------------<  98  3.5   |  21<L>  |  0.88    Ca    9.8      31 Aug 2021 10:37  Mg     2.1     08-31    TPro  7.5  /  Alb  4.1  /  TBili  0.4  /  DBili  x   /  AST  21  /  ALT  19  /  AlkPhos  72  08-31        CAPILLARY BLOOD GLUCOSE                MEDICATIONS  (STANDING):  apixaban 5 milliGRAM(s) Oral every 12 hours  benzonatate 100 milliGRAM(s) Oral three times a day  buPROPion XL (24-Hour) . 300 milliGRAM(s) Oral daily  flecainide 100 milliGRAM(s) Oral <User Schedule>  flecainide 150 milliGRAM(s) Oral <User Schedule>  metoprolol succinate  milliGRAM(s) Oral daily  multivitamin 1 Tablet(s) Oral daily  omega-3-Acid Ethyl Esters 2 Gram(s) Oral two times a day    MEDICATIONS  (PRN):  acetaminophen   Tablet .. 650 milliGRAM(s) Oral every 6 hours PRN Temp greater or equal to 38C (100.4F), Mild Pain (1 - 3)      Care Discussed with Consultants/Other Providers [ ] YES  [ ] NO
24H hour events: No acute events overnight, Tele: SB/SR at 50-60's (up to 70's at times), no Afib noted over night and this morning      MEDICATIONS:  apixaban 5 milliGRAM(s) Oral every 12 hours  flecainide 100 milliGRAM(s) Oral <User Schedule>  flecainide 150 milliGRAM(s) Oral <User Schedule>  metoprolol succinate ER 75 milliGRAM(s) Oral two times a day  benzonatate 100 milliGRAM(s) Oral three times a day  acetaminophen   Tablet .. 650 milliGRAM(s) Oral every 6 hours PRN  buPROPion XL (24-Hour) . 300 milliGRAM(s) Oral daily  multivitamin 1 Tablet(s) Oral daily      REVIEW OF SYSTEMS:  Complete 10point ROS negative.    PHYSICAL EXAM:  T(C): 36.6 (09-03-21 @ 05:11), Max: 37 (09-02-21 @ 17:33)  HR: 77 (09-03-21 @ 05:11) (55 - 77)  BP: 100/66 (09-03-21 @ 05:11) (100/66 - 118/72)  RR: 18 (09-03-21 @ 05:11) (18 - 19)  SpO2: 97% (09-03-21 @ 05:11) (96% - 98%)  Wt(kg): --  I&O's Summary    02 Sep 2021 07:01  -  03 Sep 2021 07:00  --------------------------------------------------------  IN: 1560 mL / OUT: 0 mL / NET: 1560 mL    PE: per primary team       LABS:	 	    CBC Full  -  ( 03 Sep 2021 05:49 )  WBC Count : 4.57 K/uL  Hemoglobin : 13.4 g/dL  Hematocrit : 41.6 %  Platelet Count - Automated : 185 K/uL  Mean Cell Volume : 90.6 fl  Mean Cell Hemoglobin : 29.2 pg  Mean Cell Hemoglobin Concentration : 32.2 gm/dL      09-03    137  |  105  |  13  ----------------------------<  92  4.1   |  21<L>  |  0.70  09-02    140  |  106  |  13  ----------------------------<  80  3.7   |  22  |  0.74    Ca    8.8      03 Sep 2021 05:49  Ca    8.5      02 Sep 2021 06:18  Phos  2.4     09-03  Phos  1.9     09-02  Mg     2.4     09-03  Mg     2.2     09-02    TPro  6.9  /  Alb  3.5  /  TBili  0.2  /  DBili  x   /  AST  30  /  ALT  24  /  AlkPhos  69  09-03    Thyroid Stimulating Hormone, Serum (07.29.21 @ 00:21)    Thyroid Stimulating Hormone, Serum: 1.12 uIU/mL      TELEMETRY: SB/SR at 50-60's (up to 70's at times), no Afib noted over night and this morning 	          
No further WCT on the increased Metoprolol  /66  HR 77  O2 Sat 97% on RA  Exam as per primary team    Most recent Echo was 3/26/21  EF 63%  trace AI  mild MR  moderate-severely dilated LA                                                mild TR  est PA 42 mm Hg    BUN 13  Crt 0.70  CBC  normal    Imp:  PAF on Eliquis Metoprolol 75 mg bid  The WCT seems to have improved on the increased metoprolol.  EP wants to continue Flecainide despite the WCT  ?? DC to home and schedule RF ablation with Dr Lechuga in a couple of weeks when the Covid infection has   resolved
24H hour events:   Spoke with patient via phone; she still feels short of breath and fatigued this morning; had intermittent palpitations on admission and feels it when she goes to Afib    MEDICATIONS:  apixaban 5 milliGRAM(s) Oral every 12 hours  flecainide 150 milliGRAM(s) Oral <User Schedule>  metoprolol succinate  milliGRAM(s) Oral daily  acetaminophen   Tablet .. 650 milliGRAM(s) Oral every 6 hours PRN  buPROPion XL (24-Hour) . 300 milliGRAM(s) Oral daily  multivitamin 1 Tablet(s) Oral daily    Review of system:  Note as above      PHYSICAL EXAM:  T(C): 37.7 (09-01-21 @ 09:05), Max: 38.1 (08-31-21 @ 19:55)  HR: 64 (09-01-21 @ 04:12) (64 - 115)  BP: 104/66 (09-01-21 @ 04:12) (104/66 - 158/81)  RR: 18 (09-01-21 @ 04:12) (14 - 20)  SpO2: 96% (09-01-21 @ 04:12) (96% - 100%)  Wt(kg): --  I&O's Summary      LABS:	 	    CBC Full  -  ( 31 Aug 2021 10:37 )  WBC Count : 7.94 K/uL  Hemoglobin : 14.4 g/dL  Hematocrit : 43.5 %  Platelet Count - Automated : 195 K/uL  Mean Cell Volume : 88.4 fl  Mean Cell Hemoglobin : 29.3 pg  Mean Cell Hemoglobin Concentration : 33.1 gm/dL  Auto Neutrophil # : 5.70 K/uL  Auto Lymphocyte # : 0.96 K/uL  Auto Monocyte # : 1.09 K/uL  Auto Eosinophil # : 0.15 K/uL  Auto Basophil # : 0.02 K/uL  Auto Neutrophil % : 71.7 %  Auto Lymphocyte % : 12.1 %  Auto Monocyte % : 13.7 %  Auto Eosinophil % : 1.9 %  Auto Basophil % : 0.3 %    08-31    137  |  99  |  14  ----------------------------<  98  3.5   |  21<L>  |  0.88    Ca    9.8      31 Aug 2021 10:37  Mg     2.1     08-31    TPro  7.5  /  Alb  4.1  /  TBili  0.4  /  DBili  x   /  AST  21  /  ALT  19  /  AlkPhos  72  08-31      proBNP:   Lipid Profile:   HgA1c:   TSH:       CARDIAC MARKERS:      TELEMETRY: Was in Afib on admission, converted to Afib around 5:42am today; had pAF twice lasting about one minute  	      
Patient is a 71y old  Female who presents with a chief complaint of SOB / afib with RVR (02 Sep 2021 09:01)      INTERVAL HPI/OVERNIGHT EVENTS: seen and examined, again had afib rhythm in morning , no CP   T(C): 36.8 (09-02-21 @ 20:57), Max: 37.1 (09-02-21 @ 04:42)  HR: 60 (09-02-21 @ 20:57) (55 - 70)  BP: 104/62 (09-02-21 @ 20:57) (104/62 - 118/72)  RR: 18 (09-02-21 @ 20:57) (18 - 19)  SpO2: 98% (09-02-21 @ 20:57) (94% - 98%)  Wt(kg): --  I&O's Summary    01 Sep 2021 07:01  -  02 Sep 2021 07:00  --------------------------------------------------------  IN: 1200 mL / OUT: 0 mL / NET: 1200 mL    02 Sep 2021 07:01  -  03 Sep 2021 02:05  --------------------------------------------------------  IN: 1560 mL / OUT: 0 mL / NET: 1560 mL        PAST MEDICAL & SURGICAL HISTORY:  HTN (hypertension)    Depression    GERD (gastroesophageal reflux disease)    Afib    History of hysterectomy        SOCIAL HISTORY  Alcohol:  Tobacco:  Illicit substance use:    FAMILY HISTORY:    REVIEW OF SYSTEMS:  CONSTITUTIONAL: No fever, weight loss, or fatigue  EYES: No eye pain, visual disturbances, or discharge  ENMT:  No difficulty hearing, tinnitus, vertigo; No sinus or throat pain  NECK: No pain or stiffness  RESPIRATORY: No cough, wheezing, chills or hemoptysis; No shortness of breath  CARDIOVASCULAR: No chest pain, palpitations, dizziness, or leg swelling  GASTROINTESTINAL: No abdominal or epigastric pain. No nausea, vomiting, or hematemesis; No diarrhea or constipation. No melena or hematochezia.  GENITOURINARY: No dysuria, frequency, hematuria, or incontinence  NEUROLOGICAL: No headaches, memory loss, loss of strength, numbness, or tremors  SKIN: No itching, burning, rashes, or lesions   LYMPH NODES: No enlarged glands  ENDOCRINE: No heat or cold intolerance; No hair loss  MUSCULOSKELETAL: No joint pain or swelling; No muscle, back, or extremity pain  PSYCHIATRIC: No depression, anxiety, mood swings, or difficulty sleeping  HEME/LYMPH: No easy bruising, or bleeding gums  ALLERY AND IMMUNOLOGIC: No hives or eczema    RADIOLOGY & ADDITIONAL TESTS:    Imaging Personally Reviewed:  [ ] YES  [ ] NO    Consultant(s) Notes Reviewed:  [ ] YES  [ ] NO    PHYSICAL EXAM:  GENERAL: NAD, well-groomed, well-developed  HEAD:  Atraumatic, Normocephalic  EYES: EOMI, PERRLA, conjunctiva and sclera clear  ENMT: No tonsillar erythema, exudates, or enlargement; Moist mucous membranes, Good dentition, No lesions  NECK: Supple, No JVD, Normal thyroid  NERVOUS SYSTEM:  Alert & Oriented X3, Good concentration; Motor Strength 5/5 B/L upper and lower extremities; DTRs 2+ intact and symmetric  CHEST/LUNG: Clear to percussion bilaterally; No rales, rhonchi, wheezing, or rubs  HEART: Regular rate and rhythm; No murmurs, rubs, or gallops  ABDOMEN: Soft, Nontender, Nondistended; Bowel sounds present  EXTREMITIES:  2+ Peripheral Pulses, No clubbing, cyanosis, or edema  LYMPH: No lymphadenopathy noted  SKIN: No rashes or lesions    LABS:    09-02    140  |  106  |  13  ----------------------------<  80  3.7   |  22  |  0.74    Ca    8.5      02 Sep 2021 06:18  Phos  1.9     09-02  Mg     2.2     09-02          CAPILLARY BLOOD GLUCOSE                MEDICATIONS  (STANDING):  apixaban 5 milliGRAM(s) Oral every 12 hours  benzonatate 100 milliGRAM(s) Oral three times a day  buPROPion XL (24-Hour) . 300 milliGRAM(s) Oral daily  flecainide 100 milliGRAM(s) Oral <User Schedule>  flecainide 150 milliGRAM(s) Oral <User Schedule>  metoprolol succinate ER 75 milliGRAM(s) Oral two times a day  multivitamin 1 Tablet(s) Oral daily  omega-3-Acid Ethyl Esters 2 Gram(s) Oral two times a day    MEDICATIONS  (PRN):  acetaminophen   Tablet .. 650 milliGRAM(s) Oral every 6 hours PRN Temp greater or equal to 38C (100.4F), Mild Pain (1 - 3)      Care Discussed with Consultants/Other Providers [ ] YES  [ ] NO
SUBJECTIVE:  7 beats WCT noted on Tele overnight   MEDICATIONS:  flecainide 100 milliGRAM(s) Oral <User Schedule>  flecainide 150 milliGRAM(s) Oral <User Schedule>  metoprolol succinate  milliGRAM(s) Oral daily      benzonatate 100 milliGRAM(s) Oral three times a day    acetaminophen   Tablet .. 650 milliGRAM(s) Oral every 6 hours PRN  buPROPion XL (24-Hour) . 300 milliGRAM(s) Oral daily        apixaban 5 milliGRAM(s) Oral every 12 hours  multivitamin 1 Tablet(s) Oral daily      REVIEW OF SYSTEMS:    CONSTITUTIONAL: No fever, weight loss, or fatigue  EYES: No eye pain, visual disturbances, or discharge  NECK: No pain or stiffness  RESPIRATORY: No cough, wheezing, chills or hemoptysis; No Shortness of Breath  CARDIOVASCULAR: No chest pain, palpitations, dizziness, or leg swelling  GASTROINTESTINAL: No abdominal or epigastric pain. No nausea, vomiting, or hematemesis; No diarrhea or constipation. No melena or hematochezia.  GENITOURINARY: No dysuria, frequency, hematuria, or incontinence  NEUROLOGICAL: No headaches, memory loss, loss of strength, numbness, or tremors  SKIN: No itching, burning, rashes, or lesions   LYMPH Nodes: No enlarged glands  MUSCULOSKELETAL: No joint pain or swelling; No muscle, back, or extremity pain  All other review of systems are negative.  	  [ ] Unable to obtain    PHYSICAL EXAM:  T(C): 37.1 (09-02-21 @ 04:42), Max: 37.7 (09-01-21 @ 09:05)  HR: 70 (09-02-21 @ 04:42) (58 - 70)  BP: 115/78 (09-02-21 @ 04:42) (107/66 - 126/67)  RR: 18 (09-02-21 @ 04:42) (18 - 19)  SpO2: 94% (09-02-21 @ 04:42) (94% - 99%)  Wt(kg): --  I&O's Summary    01 Sep 2021 07:01  -  02 Sep 2021 07:00  --------------------------------------------------------  IN: 1200 mL / OUT: 0 mL / NET: 1200 mL    Remainder of Exam per primary team    TELEMETRY: 	  7 beats WCT      LABS:	 	                            14.4   7.94  )-----------( 195      ( 31 Aug 2021 10:37 )             43.5     09-02    140  |  106  |  13  ----------------------------<  80  3.7   |  22  |  0.74    Ca    8.5      02 Sep 2021 06:18  Phos  1.9     09-02  Mg     2.2     09-02    TPro  7.5  /  Alb  4.1  /  TBili  0.4  /  DBili  x   /  AST  21  /  ALT  19  /  AlkPhos  72  08-31

## 2021-09-03 NOTE — DISCHARGE NOTE NURSING/CASE MANAGEMENT/SOCIAL WORK - PATIENT PORTAL LINK FT
You can access the FollowMyHealth Patient Portal offered by [facility] by registering at the following website: [email or web address]. By joining [facility]’s FollowMyHealth portal, you will also be able to view your health information using other applications (apps) compatible with our system.

## 2021-09-03 NOTE — DISCHARGE NOTE NURSING/CASE MANAGEMENT/SOCIAL WORK - NSDCVIVACCINE_GEN_ALL_CORE_FT
Tdap; 10-Dec-2017 16:26; Ivania Jacome (SYLVIE); [employer]; [id number]; IntraMuscular; Deltoid Left.; 0.5 milliLiter(s); VIS (VIS Published: 09-May-2013, VIS Presented: 10-Dec-2017);

## 2021-10-06 PROBLEM — I10 ESSENTIAL HYPERTENSION: Status: ACTIVE | Noted: 2020-02-26

## 2021-11-02 ENCOUNTER — NON-APPOINTMENT (OUTPATIENT)
Age: 71
End: 2021-11-02

## 2021-11-08 ENCOUNTER — OUTPATIENT (OUTPATIENT)
Dept: OUTPATIENT SERVICES | Facility: HOSPITAL | Age: 71
LOS: 1 days | End: 2021-11-08
Payer: MEDICARE

## 2021-11-08 VITALS
HEIGHT: 63 IN | SYSTOLIC BLOOD PRESSURE: 128 MMHG | WEIGHT: 187.39 LBS | TEMPERATURE: 97 F | DIASTOLIC BLOOD PRESSURE: 78 MMHG | HEART RATE: 61 BPM | RESPIRATION RATE: 16 BRPM

## 2021-11-08 DIAGNOSIS — Z29.9 ENCOUNTER FOR PROPHYLACTIC MEASURES, UNSPECIFIED: ICD-10-CM

## 2021-11-08 DIAGNOSIS — I10 ESSENTIAL (PRIMARY) HYPERTENSION: ICD-10-CM

## 2021-11-08 DIAGNOSIS — Z01.818 ENCOUNTER FOR OTHER PREPROCEDURAL EXAMINATION: ICD-10-CM

## 2021-11-08 DIAGNOSIS — Z90.710 ACQUIRED ABSENCE OF BOTH CERVIX AND UTERUS: Chronic | ICD-10-CM

## 2021-11-08 DIAGNOSIS — Z98.890 OTHER SPECIFIED POSTPROCEDURAL STATES: Chronic | ICD-10-CM

## 2021-11-08 DIAGNOSIS — I48.91 UNSPECIFIED ATRIAL FIBRILLATION: ICD-10-CM

## 2021-11-08 LAB
ANION GAP SERPL CALC-SCNC: 11 MMOL/L — SIGNIFICANT CHANGE UP (ref 5–17)
APTT BLD: 37.2 SEC — HIGH (ref 27.5–35.5)
BASOPHILS # BLD AUTO: 0.05 K/UL — SIGNIFICANT CHANGE UP (ref 0–0.2)
BASOPHILS NFR BLD AUTO: 0.6 % — SIGNIFICANT CHANGE UP (ref 0–2)
BLD GP AB SCN SERPL QL: SIGNIFICANT CHANGE UP
BUN SERPL-MCNC: 20.9 MG/DL — HIGH (ref 8–20)
CALCIUM SERPL-MCNC: 9.6 MG/DL — SIGNIFICANT CHANGE UP (ref 8.6–10.2)
CHLORIDE SERPL-SCNC: 103 MMOL/L — SIGNIFICANT CHANGE UP (ref 98–107)
CO2 SERPL-SCNC: 27 MMOL/L — SIGNIFICANT CHANGE UP (ref 22–29)
CREAT SERPL-MCNC: 0.73 MG/DL — SIGNIFICANT CHANGE UP (ref 0.5–1.3)
EOSINOPHIL # BLD AUTO: 0.22 K/UL — SIGNIFICANT CHANGE UP (ref 0–0.5)
EOSINOPHIL NFR BLD AUTO: 2.8 % — SIGNIFICANT CHANGE UP (ref 0–6)
GLUCOSE SERPL-MCNC: 88 MG/DL — SIGNIFICANT CHANGE UP (ref 70–99)
HCT VFR BLD CALC: 42.1 % — SIGNIFICANT CHANGE UP (ref 34.5–45)
HGB BLD-MCNC: 13.4 G/DL — SIGNIFICANT CHANGE UP (ref 11.5–15.5)
IMM GRANULOCYTES NFR BLD AUTO: 0.3 % — SIGNIFICANT CHANGE UP (ref 0–1.5)
INR BLD: 1.21 RATIO — HIGH (ref 0.88–1.16)
LYMPHOCYTES # BLD AUTO: 2.55 K/UL — SIGNIFICANT CHANGE UP (ref 1–3.3)
LYMPHOCYTES # BLD AUTO: 32.3 % — SIGNIFICANT CHANGE UP (ref 13–44)
MAGNESIUM SERPL-MCNC: 2.1 MG/DL — SIGNIFICANT CHANGE UP (ref 1.8–2.6)
MCHC RBC-ENTMCNC: 29.2 PG — SIGNIFICANT CHANGE UP (ref 27–34)
MCHC RBC-ENTMCNC: 31.8 GM/DL — LOW (ref 32–36)
MCV RBC AUTO: 91.7 FL — SIGNIFICANT CHANGE UP (ref 80–100)
MONOCYTES # BLD AUTO: 0.63 K/UL — SIGNIFICANT CHANGE UP (ref 0–0.9)
MONOCYTES NFR BLD AUTO: 8 % — SIGNIFICANT CHANGE UP (ref 2–14)
NEUTROPHILS # BLD AUTO: 4.43 K/UL — SIGNIFICANT CHANGE UP (ref 1.8–7.4)
NEUTROPHILS NFR BLD AUTO: 56 % — SIGNIFICANT CHANGE UP (ref 43–77)
PLATELET # BLD AUTO: 231 K/UL — SIGNIFICANT CHANGE UP (ref 150–400)
POTASSIUM SERPL-MCNC: 4.7 MMOL/L — SIGNIFICANT CHANGE UP (ref 3.5–5.3)
POTASSIUM SERPL-SCNC: 4.7 MMOL/L — SIGNIFICANT CHANGE UP (ref 3.5–5.3)
PROTHROM AB SERPL-ACNC: 13.9 SEC — HIGH (ref 10.6–13.6)
RBC # BLD: 4.59 M/UL — SIGNIFICANT CHANGE UP (ref 3.8–5.2)
RBC # FLD: 13.6 % — SIGNIFICANT CHANGE UP (ref 10.3–14.5)
SARS-COV-2 RNA SPEC QL NAA+PROBE: SIGNIFICANT CHANGE UP
SODIUM SERPL-SCNC: 141 MMOL/L — SIGNIFICANT CHANGE UP (ref 135–145)
WBC # BLD: 7.9 K/UL — SIGNIFICANT CHANGE UP (ref 3.8–10.5)
WBC # FLD AUTO: 7.9 K/UL — SIGNIFICANT CHANGE UP (ref 3.8–10.5)

## 2021-11-08 PROCEDURE — 93010 ELECTROCARDIOGRAM REPORT: CPT

## 2021-11-08 RX ORDER — UBROGEPANT 100 MG/1
0 TABLET ORAL
Qty: 0 | Refills: 0 | DISCHARGE

## 2021-11-08 RX ORDER — ASCORBIC ACID 60 MG
0 TABLET,CHEWABLE ORAL
Qty: 0 | Refills: 0 | DISCHARGE

## 2021-11-08 RX ORDER — CHOLECALCIFEROL (VITAMIN D3) 125 MCG
0 CAPSULE ORAL
Qty: 0 | Refills: 0 | DISCHARGE

## 2021-11-08 RX ORDER — OMEGA-3 ACID ETHYL ESTERS 1 G
2 CAPSULE ORAL
Qty: 0 | Refills: 0 | DISCHARGE

## 2021-11-08 RX ORDER — MULTIVIT-MIN/FERROUS GLUCONATE 9 MG/15 ML
0 LIQUID (ML) ORAL
Qty: 0 | Refills: 0 | DISCHARGE

## 2021-11-08 RX ORDER — MAGNESIUM OXIDE 400 MG ORAL TABLET 241.3 MG
2 TABLET ORAL
Qty: 0 | Refills: 0 | DISCHARGE

## 2021-11-08 RX ORDER — EPINASTINE HYDROCHLORIDE 0.5 MG/ML
1 SOLUTION OPHTHALMIC
Qty: 0 | Refills: 0 | DISCHARGE

## 2021-11-08 RX ORDER — CYCLOSPORINE 0.5 MG/ML
1 EMULSION OPHTHALMIC
Qty: 0 | Refills: 0 | DISCHARGE

## 2021-11-08 NOTE — H&P PST ADULT - HISTORY OF PRESENT ILLNESS
Pt 70 y/o female with hypertension. CAD with previous MI Pt seen today pre-op for A-FIB Ablation/CARTO/ROSS ON Table/ANES.  Pt was recently seen in the emergency room at [facility] She was found to be in A. fib with RVR which converted back to sinus rhythm after 10 mg IV metoprolol. Pt report a prior episode of atrial fibrillation in July 15, 2021, She feels that her AF is triggered by emotional stress as it relates to issues with care of her elderly mother. She has occasional symptoms of shortness of breath and palpitations. She has no dizziness, lightheadedness, chest pain, syncope or presyncope. Pt currently on Eliquis BID,  flecainide 100 mg BID and  Toprol- mg daily,  hydrochlorothiazide 25 mg/day.      Cardiac Results:   Echo 9/3/21  EF  55-60%  1. Normal mitral valve. Mild mitral regurgitation.  2. Normal trileaflet aortic valve. Minimal aortic regurgitation.  3. Endocardium not well visualized; grossly normal left ventricular systolic function.  4. Moderate diastolic dysfunction (Stage II).  5. Normal right ventricular size and function.             Pt 72 y/o female with hypertension, oA, CAD with previous MI Pt seen today pre-op for A-FIB Ablation/CARTO/ROSS ON Table/ANES.  Pt report x2 recent hospitalization in August and mostrecently seen in the emergency room at [facility] She was found to be in A. fib with RVR which converted back to sinus rhythm after 10 mg IV metoprolol. Pt report a prior episode of atrial fibrillation in July 15, 2021. Pt  report she  feels that her AF is triggered by emotional stress  relates to issues with care of her elderly mother. Pt report occasional symptoms of shortness of breath and palpitations October 31st, 2021. Pt denies chest pain  no dizziness, lightheadedness, chest pain, syncope or presyncope. Pt currently on Eliquis BID,  flecainide 100 mg BID and  Toprol- mg daily,  hydrochlorothiazide 25 mg/day.. Report family hx of A-fib(mother and sister)      Cardiac Results:   Echo 9/3/21  EF  55-60%  1. Normal mitral valve. Mild mitral regurgitation.  2. Normal trileaflet aortic valve. Minimal aortic regurgitation.  3. Endocardium not well visualized; grossly normal left ventricular systolic function.  4. Moderate diastolic dysfunction (Stage II).  5. Normal right ventricular size and function.             Pt 72 y/o female with hypertension, OA,, Depression, CAD with previous MI, A-FIB  Pt seen today pre-op for A-FIB Ablation/CARTO/ROSS ON TABLE /ANES.  Pt report x2 recent hospitalization in August 2021, recently pt was seen in the emergency room at [facility] for symptoms of SOB and Palpitation. Pt  was found to be in A. Fib  with RVR which converted back to sinus rhythm after 10 mg IV metoprolol. Pt report occasional symptoms of shortness of breath and palpitations persist,  last episode October 31st, 2021.  Pt  report she  feels that her AF is triggered by emotional stress relates to issues with care of her elderly mother. Report family hx of A-fib(mother and sister).  Pt today  denies chest pain, denies  dizziness, denies lightheadedness, denies chest pain, denies syncope or presyncope. Pt currently on Eliquis BID,  flecainide BID and  Toprol-XL BID. Seen today for a scheduled procedure on 11/11/21 with Dr. Quinonez.      Cardiac Results:   Echo 9/3/21  EF  55-60%  1. Normal mitral valve. Mild mitral regurgitation.  2. Normal trileaflet aortic valve. Minimal aortic regurgitation.  3. Endocardium not well visualized; grossly normal left ventricular systolic function.  4. Moderate diastolic dysfunction (Stage II).  5. Normal right ventricular size and function.             Pt 70 y/o female with hypertension, OA,, Depression, CAD with previous MI, A-FIB  Pt seen today pre-op for A-FIB Ablation/CARTO/ROSS ON TABLE /ANES.  Pt report x2 recent hospitalization in August 2021, recently pt was seen in the emergency room at [facility] for symptoms of SOB and Palpitation. Pt  was found to be in A. Fib  with RVR which converted back to sinus rhythm after 10 mg IV metoprolol. Pt report occasional symptoms of shortness of breath and palpitations persist,  last episode October 31st, 2021.  Pt  report she  feels that her AF is triggered by emotional stress relates to issues with care of her elderly mother. Report family hx of A-fib(mother and sister).  Pt today  denies chest pain, denies  dizziness, denies lightheadedness, denies chest pain, denies syncope or presyncope. Pt currently on Eliquis BID,  flecainide BID and  Toprol-XL BID. Seen today for a scheduled procedure on 11/11/21 with Dr. Quinonez.      Cardiac Results:   EKG 11/8/21: Sinus bradycardia 54 bpm  Echo 9/3/21  EF  55-60%  1. Normal mitral valve. Mild mitral regurgitation.  2. Normal trileaflet aortic valve. Minimal aortic regurgitation.  3. Endocardium not well visualized; grossly normal left ventricular systolic function.  4. Moderate diastolic dysfunction (Stage II).  5. Normal right ventricular size and function.

## 2021-11-08 NOTE — H&P PST ADULT - NSICDXPASTSURGICALHX_GEN_ALL_CORE_FT
PAST SURGICAL HISTORY:  History of hysterectomy      PAST SURGICAL HISTORY:  History of eye surgery both    History of hysterectomy

## 2021-11-08 NOTE — H&P PST ADULT - NSICDXFAMILYHX_GEN_ALL_CORE_FT
FAMILY HISTORY:  No pertinent family history in first degree relatives     FAMILY HISTORY:  Mother  Still living? Unknown  FH: atrial fibrillation, Age at diagnosis: Age Unknown    Sibling  Still living? Yes, Estimated age: Age Unknown  FH: atrial fibrillation, Age at diagnosis: Age Unknown

## 2021-11-08 NOTE — H&P PST ADULT - NSICDXPASTMEDICALHX_GEN_ALL_CORE_FT
PAST MEDICAL HISTORY:  Afib     Depression     GERD (gastroesophageal reflux disease)     HTN (hypertension)     WPW syndrome      PAST MEDICAL HISTORY:  Afib     Depression     GERD (gastroesophageal reflux disease)     HTN (hypertension)     OA (osteoarthritis)     PRIYA (obstructive sleep apnea) no device used    Tear meniscus knee left knee    WPW syndrome

## 2021-11-08 NOTE — H&P PST ADULT - ASSESSMENT
Pt 72 y/o female seen today pre-op for A-FIB Ablation/CARTO/ROSS ON Table/ANES.       Plan:   Labs pending.   Pre-procedure instructions provided (verbal & written) as follows:  Ablation 2021   - Last dose Eliquis 11/10/2021 in PM (NO Eliquis the morning of procedur   - NPO after midnight prior except cardiac meds w/ sips of water.     BETINAI VTE 2.0 SCORE [CLOT updated 2019]    AGE RELATED RISK FACTORS                                                       MOBILITY RELATED FACTORS  [ ] Age 41-60 years                                            (1 Point)                    [ ] Bed rest                                                        (1 Point)  [x ] Age: 61-74 years                                           (2 Points)                  [ ] Plaster cast                                                   (2 Points)  [ ] Age= 75 years                                              (3 Points)                    [ ] Bed bound for more than 72 hours                 (2 Points)    DISEASE RELATED RISK FACTORS                                               GENDER SPECIFIC FACTORS  [ ] Edema in the lower extremities                       (1 Point)              [ ] Pregnancy                                                     (1 Point)  [ ] Varicose veins                                               (1 Point)                     [ ] Post-partum < 6 weeks                                   (1 Point)             [ x] BMI > 25 Kg/m2                                            (1 Point)                     [ ] Hormonal therapy  or oral contraception          (1 Point)                 [ ] Sepsis (in the previous month)                        (1 Point)               [ ] History of pregnancy complications                 (1 point)  [ ] Pneumonia or serious lung disease                                               [ ] Unexplained or recurrent                     (1 Point)           (in the previous month)                               (1 Point)  [ ] Abnormal pulmonary function test                     (1 Point)                 SURGERY RELATED RISK FACTORS  [ ] Acute myocardial infarction                              (1 Point)               [ ]  Section                                             (1 Point)  [ ] Congestive heart failure (in the previous month)  (1 Point)      [ ] Minor surgery                                                  (1 Point)   [ ] Inflammatory bowel disease                             (1 Point)               [ ] Arthroscopic surgery                                        (2 Points)  [ ] Central venous access                                      (2 Points)                [x] General surgery lasting more than 45 minutes (2 points)  [ ] Malignancy- Present or previous                   (2 Points)                [ ] Elective arthroplasty                                         (5 points)    [ ] Stroke (in the previous month)                          (5 Points)                                                                                                                                                           HEMATOLOGY RELATED FACTORS                                                 TRAUMA RELATED RISK FACTORS  [ ] Prior episodes of VTE                                     (3 Points)                [ ] Fracture of the hip, pelvis, or leg                       (5 Points)  [ ] Positive family history for VTE                         (3 Points)             [ ] Acute spinal cord injury (in the previous month)  (5 Points)  [ ] Prothrombin 81147 A                                     (3 Points)               [ ] Paralysis  (less than 1 month)                             (5 Points)  [ ] Factor V Leiden                                             (3 Points)                  [ ] Multiple Trauma within 1 month                        (5 Points)  [ ] Lupus anticoagulants                                     (3 Points)                                                           [ ] Anticardiolipin antibodies                               (3 Points)                                                       [ ] High homocysteine in the blood                      (3 Points)                                             [ ] Other congenital or acquired thrombophilia      (3 Points)                                                [ ] Heparin induced thrombocytopenia                  (3 Points)                                     Total Score [     5     ]  OPIOID RISK TOOL    SHAYAN EACH BOX THAT APPLIES AND ADD TOTALS AT THE END    FAMILY HISTORY OF SUBSTANCE ABUSE                 FEMALE         MALE                                                Alcohol                             [  ]1 pt          [  ]3pts                                               Illegal Durgs                     [  ]2 pts        [  ]3pts                                               Rx Drugs                           [  ]4 pts        [  ]4 pts    PERSONAL HISTORY OF SUBSTANCE ABUSE                                                                                          Alcohol                             [  ]3 pts       [  ]3 pts                                               Illegal Drugs                     [  ]4 pts        [  ]4 pts                                               Rx Drugs                           [  ]5 pts        [  ]5 pts    AGE BETWEEN 16-45 YEARS                                      [  ]1 pt         [  ]1 pt    HISTORY OF PREADOLESCENT   SEXUAL ABUSE                                                             [  ]3 pts        [  ]0pts    PSYCHOLOGICAL DISEASE                     ADD, OCD, Bipolar, Schizophrenia        [  ]2 pts         [  ]2 pts                      Depression                                               [ x ]1 pt           [  ]1 pt           SCORING TOTAL   (add numbers and type here)              (**1*)                                     A score of 3 or lower indicated LOW risk for future opioid abuse  A score of 4 to 7 indicated moderate risk for future opioid abuse  A score of 8 or higher indicates a high risk for opioid abuse Pt 70 y/o female seen today pre-op for A-FIB Ablation/CARTO/ROSS ON Table/ANES.       Plan:   Labs pending.   Pre-procedure instructions provided (verbal & written) as follows:  Ablation 2021   - Last dose Eliquis 11/10/2021 in am (NO Eliquis the  evening prior and morning of procedure).    - NPO after midnight prior except cardiac meds w/ sips of water.     CAPRINI VTE 2.0 SCORE [CLOT updated 2019]    AGE RELATED RISK FACTORS                                                       MOBILITY RELATED FACTORS  [ ] Age 41-60 years                                            (1 Point)                    [ ] Bed rest                                                        (1 Point)  [x ] Age: 61-74 years                                           (2 Points)                  [ ] Plaster cast                                                   (2 Points)  [ ] Age= 75 years                                              (3 Points)                    [ ] Bed bound for more than 72 hours                 (2 Points)    DISEASE RELATED RISK FACTORS                                               GENDER SPECIFIC FACTORS  [ ] Edema in the lower extremities                       (1 Point)              [ ] Pregnancy                                                     (1 Point)  [ ] Varicose veins                                               (1 Point)                     [ ] Post-partum < 6 weeks                                   (1 Point)             [ x] BMI > 25 Kg/m2                                            (1 Point)                     [ ] Hormonal therapy  or oral contraception          (1 Point)                 [ ] Sepsis (in the previous month)                        (1 Point)               [ ] History of pregnancy complications                 (1 point)  [ ] Pneumonia or serious lung disease                                               [ ] Unexplained or recurrent                     (1 Point)           (in the previous month)                               (1 Point)  [ ] Abnormal pulmonary function test                     (1 Point)                 SURGERY RELATED RISK FACTORS  [ ] Acute myocardial infarction                              (1 Point)               [ ]  Section                                             (1 Point)  [ ] Congestive heart failure (in the previous month)  (1 Point)      [ ] Minor surgery                                                  (1 Point)   [ ] Inflammatory bowel disease                             (1 Point)               [ ] Arthroscopic surgery                                        (2 Points)  [ ] Central venous access                                      (2 Points)                [x] General surgery lasting more than 45 minutes (2 points)  [ ] Malignancy- Present or previous                   (2 Points)                [ ] Elective arthroplasty                                         (5 points)    [ ] Stroke (in the previous month)                          (5 Points)                                                                                                                                                           HEMATOLOGY RELATED FACTORS                                                 TRAUMA RELATED RISK FACTORS  [ ] Prior episodes of VTE                                     (3 Points)                [ ] Fracture of the hip, pelvis, or leg                       (5 Points)  [ ] Positive family history for VTE                         (3 Points)             [ ] Acute spinal cord injury (in the previous month)  (5 Points)  [ ] Prothrombin  A                                     (3 Points)               [ ] Paralysis  (less than 1 month)                             (5 Points)  [ ] Factor V Leiden                                             (3 Points)                  [ ] Multiple Trauma within 1 month                        (5 Points)  [ ] Lupus anticoagulants                                     (3 Points)                                                           [ ] Anticardiolipin antibodies                               (3 Points)                                                       [ ] High homocysteine in the blood                      (3 Points)                                             [ ] Other congenital or acquired thrombophilia      (3 Points)                                                [ ] Heparin induced thrombocytopenia                  (3 Points)                                     Total Score [     5     ]  OPIOID RISK TOOL    SHAYAN EACH BOX THAT APPLIES AND ADD TOTALS AT THE END    FAMILY HISTORY OF SUBSTANCE ABUSE                 FEMALE         MALE                                                Alcohol                             [  ]1 pt          [  ]3pts                                               Illegal Durgs                     [  ]2 pts        [  ]3pts                                               Rx Drugs                           [  ]4 pts        [  ]4 pts    PERSONAL HISTORY OF SUBSTANCE ABUSE                                                                                          Alcohol                             [  ]3 pts       [  ]3 pts                                               Illegal Drugs                     [  ]4 pts        [  ]4 pts                                               Rx Drugs                           [  ]5 pts        [  ]5 pts    AGE BETWEEN 16-45 YEARS                                      [  ]1 pt         [  ]1 pt    HISTORY OF PREADOLESCENT   SEXUAL ABUSE                                                             [  ]3 pts        [  ]0pts    PSYCHOLOGICAL DISEASE                     ADD, OCD, Bipolar, Schizophrenia        [  ]2 pts         [  ]2 pts                      Depression                                               [ x ]1 pt           [  ]1 pt           SCORING TOTAL   (add numbers and type here)              (**1*)                                     A score of 3 or lower indicated LOW risk for future opioid abuse  A score of 4 to 7 indicated moderate risk for future opioid abuse  A score of 8 or higher indicates a high risk for opioid abuse Pt 70 y/o female with hypertension, OA,, Depression, CAD with previous MI, A-FIB  Pt seen today pre-op for A-FIB Ablation/CARTO/ROSS ON TABLE /ANES.  Pt report x2 recent hospitalization in 2021, recently pt was seen in the emergency room at [facility] for symptoms of SOB and Palpitation. Pt  was found to be in A. Fib  with RVR which converted back to sinus rhythm after 10 mg IV metoprolol. Pt report occasional symptoms of shortness of breath and palpitations persist,  last episode 2021.  Pt  report she  feels that her AF is triggered by emotional stress relates to issues with care of her elderly mother. Report family hx of A-fib(mother and sister).  Pt today  denies chest pain, denies  dizziness, denies lightheadedness, denies chest pain, denies syncope or presyncope. Pt currently on Eliquis BID,  flecainide BID and  Toprol-XL BID. Seen today for a scheduled procedure on 21 with Dr. Quinonez.    Plan:   Labs pending.   Pre-procedure instructions provided (verbal & written) as follows:  Ablation 2021   - Last dose Eliquis 11/10/2021 in am (NO Eliquis the  evening prior and morning of procedure).    - NPO after midnight prior except cardiac meds w/ sips of water.     CAPRINI VTE 2.0 SCORE [CLOT updated 2019]    AGE RELATED RISK FACTORS                                                       MOBILITY RELATED FACTORS  [ ] Age 41-60 years                                            (1 Point)                    [ ] Bed rest                                                        (1 Point)  [x ] Age: 61-74 years                                           (2 Points)                  [ ] Plaster cast                                                   (2 Points)  [ ] Age= 75 years                                              (3 Points)                    [ ] Bed bound for more than 72 hours                 (2 Points)    DISEASE RELATED RISK FACTORS                                               GENDER SPECIFIC FACTORS  [ ] Edema in the lower extremities                       (1 Point)              [ ] Pregnancy                                                     (1 Point)  [ ] Varicose veins                                               (1 Point)                     [ ] Post-partum < 6 weeks                                   (1 Point)             [ x] BMI > 25 Kg/m2                                            (1 Point)                     [ ] Hormonal therapy  or oral contraception          (1 Point)                 [ ] Sepsis (in the previous month)                        (1 Point)               [ ] History of pregnancy complications                 (1 point)  [ ] Pneumonia or serious lung disease                                               [ ] Unexplained or recurrent                     (1 Point)           (in the previous month)                               (1 Point)  [ ] Abnormal pulmonary function test                     (1 Point)                 SURGERY RELATED RISK FACTORS  [ ] Acute myocardial infarction                              (1 Point)               [ ]  Section                                             (1 Point)  [ ] Congestive heart failure (in the previous month)  (1 Point)      [ ] Minor surgery                                                  (1 Point)   [ ] Inflammatory bowel disease                             (1 Point)               [ ] Arthroscopic surgery                                        (2 Points)  [ ] Central venous access                                      (2 Points)                [x] General surgery lasting more than 45 minutes (2 points)  [ ] Malignancy- Present or previous                   (2 Points)                [ ] Elective arthroplasty                                         (5 points)    [ ] Stroke (in the previous month)                          (5 Points)                                                                                                                                                           HEMATOLOGY RELATED FACTORS                                                 TRAUMA RELATED RISK FACTORS  [ ] Prior episodes of VTE                                     (3 Points)                [ ] Fracture of the hip, pelvis, or leg                       (5 Points)  [ ] Positive family history for VTE                         (3 Points)             [ ] Acute spinal cord injury (in the previous month)  (5 Points)  [ ] Prothrombin 89806 A                                     (3 Points)               [ ] Paralysis  (less than 1 month)                             (5 Points)  [ ] Factor V Leiden                                             (3 Points)                  [ ] Multiple Trauma within 1 month                        (5 Points)  [ ] Lupus anticoagulants                                     (3 Points)                                                           [ ] Anticardiolipin antibodies                               (3 Points)                                                       [ ] High homocysteine in the blood                      (3 Points)                                             [ ] Other congenital or acquired thrombophilia      (3 Points)                                                [ ] Heparin induced thrombocytopenia                  (3 Points)                                     Total Score [     5     ]  OPIOID RISK TOOL    SHAYAN EACH BOX THAT APPLIES AND ADD TOTALS AT THE END    FAMILY HISTORY OF SUBSTANCE ABUSE                 FEMALE         MALE                                                Alcohol                             [  ]1 pt          [  ]3pts                                               Illegal Durgs                     [  ]2 pts        [  ]3pts                                               Rx Drugs                           [  ]4 pts        [  ]4 pts    PERSONAL HISTORY OF SUBSTANCE ABUSE                                                                                          Alcohol                             [  ]3 pts       [  ]3 pts                                               Illegal Drugs                     [  ]4 pts        [  ]4 pts                                               Rx Drugs                           [  ]5 pts        [  ]5 pts    AGE BETWEEN 16-45 YEARS                                      [  ]1 pt         [  ]1 pt    HISTORY OF PREADOLESCENT   SEXUAL ABUSE                                                             [  ]3 pts        [  ]0pts    PSYCHOLOGICAL DISEASE                     ADD, OCD, Bipolar, Schizophrenia        [  ]2 pts         [  ]2 pts                      Depression                                               [ x ]1 pt           [  ]1 pt           SCORING TOTAL   (add numbers and type here)              (**1*)                                     A score of 3 or lower indicated LOW risk for future opioid abuse  A score of 4 to 7 indicated moderate risk for future opioid abuse  A score of 8 or higher indicates a high risk for opioid abuse

## 2021-11-10 ENCOUNTER — FORM ENCOUNTER (OUTPATIENT)
Age: 71
End: 2021-11-10

## 2021-11-11 ENCOUNTER — OUTPATIENT (OUTPATIENT)
Dept: OUTPATIENT SERVICES | Facility: HOSPITAL | Age: 71
LOS: 1 days | End: 2021-11-11
Payer: MEDICARE

## 2021-11-11 ENCOUNTER — TRANSCRIPTION ENCOUNTER (OUTPATIENT)
Age: 71
End: 2021-11-11

## 2021-11-11 DIAGNOSIS — Z90.710 ACQUIRED ABSENCE OF BOTH CERVIX AND UTERUS: Chronic | ICD-10-CM

## 2021-11-11 DIAGNOSIS — Z98.890 OTHER SPECIFIED POSTPROCEDURAL STATES: Chronic | ICD-10-CM

## 2021-11-11 PROCEDURE — 93613 INTRACARDIAC EPHYS 3D MAPG: CPT

## 2021-11-11 PROCEDURE — 93655 ICAR CATH ABLTJ DSCRT ARRHYT: CPT

## 2021-11-11 PROCEDURE — 93656 COMPRE EP EVAL ABLTJ ATR FIB: CPT

## 2021-11-11 PROCEDURE — 93657 TX L/R ATRIAL FIB ADDL: CPT

## 2021-11-12 ENCOUNTER — TRANSCRIPTION ENCOUNTER (OUTPATIENT)
Age: 71
End: 2021-11-12

## 2021-11-12 PROCEDURE — 80048 BASIC METABOLIC PNL TOTAL CA: CPT

## 2021-11-12 PROCEDURE — 93320 DOPPLER ECHO COMPLETE: CPT

## 2021-11-12 PROCEDURE — C1894: CPT

## 2021-11-12 PROCEDURE — 85027 COMPLETE CBC AUTOMATED: CPT

## 2021-11-12 PROCEDURE — 93005 ELECTROCARDIOGRAM TRACING: CPT

## 2021-11-12 PROCEDURE — 93662 INTRACARDIAC ECG (ICE): CPT

## 2021-11-12 PROCEDURE — 93655 ICAR CATH ABLTJ DSCRT ARRHYT: CPT

## 2021-11-12 PROCEDURE — C1766: CPT

## 2021-11-12 PROCEDURE — G0463: CPT

## 2021-11-12 PROCEDURE — 36415 COLL VENOUS BLD VENIPUNCTURE: CPT

## 2021-11-12 PROCEDURE — C1731: CPT

## 2021-11-12 PROCEDURE — 93312 ECHO TRANSESOPHAGEAL: CPT

## 2021-11-12 PROCEDURE — C1893: CPT

## 2021-11-12 PROCEDURE — 93325 DOPPLER ECHO COLOR FLOW MAPG: CPT

## 2021-11-12 PROCEDURE — 83735 ASSAY OF MAGNESIUM: CPT

## 2021-11-12 PROCEDURE — C1732: CPT

## 2021-11-12 PROCEDURE — 93656 COMPRE EP EVAL ABLTJ ATR FIB: CPT

## 2021-11-12 PROCEDURE — C1730: CPT

## 2021-11-12 PROCEDURE — 93613 INTRACARDIAC EPHYS 3D MAPG: CPT

## 2021-11-12 PROCEDURE — C1889: CPT

## 2021-11-12 PROCEDURE — 93657 TX L/R ATRIAL FIB ADDL: CPT

## 2021-11-12 RX ORDER — FLECAINIDE ACETATE 50 MG
1 TABLET ORAL
Qty: 0 | Refills: 0 | DISCHARGE

## 2021-11-13 RX ORDER — COLCHICINE 0.6 MG/1
0.6 TABLET ORAL DAILY
Qty: 30 | Refills: 0 | Status: COMPLETED | COMMUNITY
Start: 2021-11-13 | End: 2021-11-13

## 2021-11-16 ENCOUNTER — RESULT CHARGE (OUTPATIENT)
Age: 71
End: 2021-11-16

## 2021-11-16 DIAGNOSIS — I48.91 UNSPECIFIED ATRIAL FIBRILLATION: ICD-10-CM

## 2021-11-16 PROBLEM — G47.33 OBSTRUCTIVE SLEEP APNEA (ADULT) (PEDIATRIC): Chronic | Status: ACTIVE | Noted: 2021-11-08

## 2021-11-16 PROBLEM — I45.6 PRE-EXCITATION SYNDROME: Chronic | Status: ACTIVE | Noted: 2021-11-08

## 2021-11-16 PROBLEM — S83.209A UNSPECIFIED TEAR OF UNSPECIFIED MENISCUS, CURRENT INJURY, UNSPECIFIED KNEE, INITIAL ENCOUNTER: Chronic | Status: ACTIVE | Noted: 2021-11-08

## 2021-11-16 PROBLEM — M19.90 UNSPECIFIED OSTEOARTHRITIS, UNSPECIFIED SITE: Chronic | Status: ACTIVE | Noted: 2021-11-08

## 2021-11-17 ENCOUNTER — APPOINTMENT (OUTPATIENT)
Dept: ELECTROPHYSIOLOGY | Facility: CLINIC | Age: 71
End: 2021-11-17
Payer: MEDICARE

## 2021-11-17 ENCOUNTER — NON-APPOINTMENT (OUTPATIENT)
Age: 71
End: 2021-11-17

## 2021-11-17 VITALS
TEMPERATURE: 97.3 F | SYSTOLIC BLOOD PRESSURE: 158 MMHG | OXYGEN SATURATION: 97 % | WEIGHT: 180 LBS | DIASTOLIC BLOOD PRESSURE: 82 MMHG | HEART RATE: 63 BPM | HEIGHT: 63 IN | BODY MASS INDEX: 31.89 KG/M2

## 2021-11-17 PROCEDURE — 93000 ELECTROCARDIOGRAM COMPLETE: CPT

## 2021-11-17 PROCEDURE — 99213 OFFICE O/P EST LOW 20 MIN: CPT

## 2021-11-17 RX ORDER — FLECAINIDE ACETATE 100 MG/1
100 TABLET ORAL
Qty: 225 | Refills: 3 | Status: DISCONTINUED | COMMUNITY
Start: 2020-02-27 | End: 2021-11-17

## 2021-12-06 ENCOUNTER — APPOINTMENT (OUTPATIENT)
Dept: ELECTROPHYSIOLOGY | Facility: CLINIC | Age: 71
End: 2021-12-06
Payer: MEDICARE

## 2021-12-06 ENCOUNTER — NON-APPOINTMENT (OUTPATIENT)
Age: 71
End: 2021-12-06

## 2021-12-06 VITALS
SYSTOLIC BLOOD PRESSURE: 130 MMHG | HEART RATE: 63 BPM | BODY MASS INDEX: 31.89 KG/M2 | TEMPERATURE: 97.3 F | HEIGHT: 63 IN | DIASTOLIC BLOOD PRESSURE: 72 MMHG | WEIGHT: 180 LBS | OXYGEN SATURATION: 97 %

## 2021-12-06 DIAGNOSIS — Z86.79 OTHER SPECIFIED POSTPROCEDURAL STATES: ICD-10-CM

## 2021-12-06 DIAGNOSIS — Z98.890 OTHER SPECIFIED POSTPROCEDURAL STATES: ICD-10-CM

## 2021-12-06 PROCEDURE — 99214 OFFICE O/P EST MOD 30 MIN: CPT

## 2021-12-06 PROCEDURE — 93000 ELECTROCARDIOGRAM COMPLETE: CPT

## 2021-12-08 ENCOUNTER — APPOINTMENT (OUTPATIENT)
Dept: PULMONOLOGY | Facility: CLINIC | Age: 71
End: 2021-12-08
Payer: MEDICARE

## 2021-12-08 VITALS
DIASTOLIC BLOOD PRESSURE: 77 MMHG | HEART RATE: 61 BPM | TEMPERATURE: 97.8 F | OXYGEN SATURATION: 97 % | SYSTOLIC BLOOD PRESSURE: 121 MMHG

## 2021-12-08 DIAGNOSIS — R07.89 OTHER CHEST PAIN: ICD-10-CM

## 2021-12-08 PROCEDURE — 99205 OFFICE O/P NEW HI 60 MIN: CPT | Mod: CS,25

## 2021-12-08 PROCEDURE — 71046 X-RAY EXAM CHEST 2 VIEWS: CPT

## 2021-12-08 RX ORDER — ROSUVASTATIN CALCIUM 5 MG/1
5 TABLET, FILM COATED ORAL
Qty: 90 | Refills: 0 | Status: ACTIVE | COMMUNITY
Start: 2021-11-26

## 2021-12-08 RX ORDER — AZILSARTAN KAMEDOXOMIL AND CHLORTHALIDONE 40; 12.5 MG/1; MG/1
40-12.5 TABLET ORAL DAILY
Refills: 0 | Status: ACTIVE | COMMUNITY

## 2021-12-08 NOTE — REVIEW OF SYSTEMS
[Fatigue] : fatigue [Cough] : cough [Depression] : depression [Anxiety] : anxiety [Negative] : Endocrine [Fever] : no fever [Chills] : no chills [Poor Appetite] : no poor appetite [Diabetes] : no diabetes [Thyroid Problem] : no thyroid problem [TextBox_30] : hx asthma  [TextBox_44] : post ablation HPI

## 2021-12-08 NOTE — PROCEDURE
[FreeTextEntry1] : Chest x-ray PA lateral December 8, 2021\par Normal cardiac size\par Mild elevation right hemidiaphragm\par Lung fields are otherwise clear without parenchymal infiltrates pleural effusions or dominant pulmonary nodules\par Impression elevation right hemidiaphragm\par \par Chest x-ray of August 31, 2021 single view\par Normal heart size clear lungs no pleural effusions no pneumothorax\par No evidence for elevation of the right heminoted on the July 28, 2021 chest x-ray at [facility] low lung volumes with poor inspiratory effort with suggestion of patchy airspace disease likely atelectatic\par

## 2021-12-08 NOTE — PHYSICAL EXAM
[No Acute Distress] : no acute distress [Normal Oropharynx] : normal oropharynx [I] : Mallampati Class: I [Normal Appearance] : normal appearance [Supple] : supple [No Neck Mass] : no neck mass [No JVD] : no jvd [Normal Rate/Rhythm] : normal rate/rhythm [Normal S1, S2] : normal s1, s2 [No Murmurs] : no murmurs [No Resp Distress] : no resp distress [No Acc Muscle Use] : no acc muscle use [Normal Palpation] : normal palpation [Normal Rhythm and Effort] : normal rhythm and effort [Clear to Auscultation Bilaterally] : clear to auscultation bilaterally [Normal to Percussion] : normal to percussion [No Abnormalities] : no abnormalities [Benign] : benign [Not Tender] : not tender [Soft] : soft [No HSM] : no hsm [Normal Bowel Sounds] : normal bowel sounds [Normal Gait] : normal gait [No Clubbing] : no clubbing [No Cyanosis] : no cyanosis [No Edema] : no edema [FROM] : FROM [Normal Color/ Pigmentation] : normal color/ pigmentation [No Focal Deficits] : no focal deficits [Oriented x3] : oriented x3 [Normal Affect] : normal affect

## 2021-12-08 NOTE — HISTORY OF PRESENT ILLNESS
[Former] : former [< 30 pack-years] : < 30 pack-years [TextBox_4] : 71-year-old urgent pulmonary evaluation\par Referral Dr. Nguyen\par Chief complaint shortness of breath dyspnea on exertion\par Chest tightness patient has a prior longstanding history of asthma without recent exacerbations or flareups\par Remote history of bronchitis\par She is status post a cardiac ablation November 11, 2021 will atrial fibrillation possibly complicated by pericarditis and currently on colchicine\par She states she had emergency room visits where she was treated with IV metoprolol to slow her heart rate\par At present she is in normal sinus rhythm\par She states she was diagnosed with COVID-19 infection August 27, 2021 which was very mild and did not require treatment\par She did not require hospitalization for the Covid infection, did not receive monoclonal antibody or other treatment protocol\par She is a former smoker with a less than 30-year pack history\par No history of chemical toxic inhalational exposures\par Noted that she is on anticoagulation Eliquis for the cardiac arrhythmias\par She remains on oral beta-blocker\par Other medications reviewed\par \par  [TextBox_11] : 1 1/2 [YearQuit] : 1995 [TextBox_29] : 14 year tobacco use

## 2021-12-08 NOTE — DISCUSSION/SUMMARY
[FreeTextEntry1] : Chest tightness with exertional dyspnea\par Cardiac arrhythmias atrial fibrillation post EPS cardiac ablation November 11\par Management for pericarditis\par New findings elevated right hemidiaphragm-rule out traumatic injury rule out paralysis-this can account for the sensation of chest tightness and contribute to shortness of breath\par History asthma\par Clinical exam does not demonstrate audible wheezing\par Post COVID-19 infection\par \par Recommendations\par CT chest\par Sniff test\par  return to office for PFT NIOX\par Add inhaled corticosteroids\par Office follow-up

## 2021-12-16 ENCOUNTER — NON-APPOINTMENT (OUTPATIENT)
Age: 71
End: 2021-12-16

## 2021-12-16 ENCOUNTER — OUTPATIENT (OUTPATIENT)
Dept: OUTPATIENT SERVICES | Facility: HOSPITAL | Age: 71
LOS: 1 days | End: 2021-12-16
Payer: MEDICARE

## 2021-12-16 ENCOUNTER — APPOINTMENT (OUTPATIENT)
Dept: RADIOLOGY | Facility: CLINIC | Age: 71
End: 2021-12-16
Payer: MEDICARE

## 2021-12-16 ENCOUNTER — APPOINTMENT (OUTPATIENT)
Dept: CT IMAGING | Facility: CLINIC | Age: 71
End: 2021-12-16
Payer: MEDICARE

## 2021-12-16 DIAGNOSIS — R06.00 DYSPNEA, UNSPECIFIED: ICD-10-CM

## 2021-12-16 DIAGNOSIS — Z90.710 ACQUIRED ABSENCE OF BOTH CERVIX AND UTERUS: Chronic | ICD-10-CM

## 2021-12-16 DIAGNOSIS — Z98.890 OTHER SPECIFIED POSTPROCEDURAL STATES: Chronic | ICD-10-CM

## 2021-12-16 PROCEDURE — 71250 CT THORAX DX C-: CPT | Mod: ME

## 2021-12-16 PROCEDURE — 71250 CT THORAX DX C-: CPT | Mod: 26,ME

## 2021-12-16 PROCEDURE — G1004: CPT

## 2021-12-18 LAB — SARS-COV-2 N GENE NPH QL NAA+PROBE: NOT DETECTED

## 2021-12-22 ENCOUNTER — APPOINTMENT (OUTPATIENT)
Dept: PULMONOLOGY | Facility: CLINIC | Age: 71
End: 2021-12-22
Payer: MEDICARE

## 2021-12-22 VITALS
WEIGHT: 180 LBS | DIASTOLIC BLOOD PRESSURE: 70 MMHG | TEMPERATURE: 97.8 F | HEART RATE: 65 BPM | OXYGEN SATURATION: 96 % | SYSTOLIC BLOOD PRESSURE: 108 MMHG | BODY MASS INDEX: 31.89 KG/M2 | HEIGHT: 63 IN

## 2021-12-22 VITALS
WEIGHT: 180 LBS | DIASTOLIC BLOOD PRESSURE: 70 MMHG | HEART RATE: 65 BPM | OXYGEN SATURATION: 96 % | SYSTOLIC BLOOD PRESSURE: 108 MMHG | TEMPERATURE: 97.8 F | HEIGHT: 63 IN | BODY MASS INDEX: 31.89 KG/M2

## 2021-12-22 PROCEDURE — 94726 PLETHYSMOGRAPHY LUNG VOLUMES: CPT

## 2021-12-22 PROCEDURE — 94618 PULMONARY STRESS TESTING: CPT

## 2021-12-22 PROCEDURE — 99214 OFFICE O/P EST MOD 30 MIN: CPT | Mod: CS,25

## 2021-12-22 PROCEDURE — 95012 NITRIC OXIDE EXP GAS DETER: CPT

## 2021-12-22 PROCEDURE — 94729 DIFFUSING CAPACITY: CPT

## 2021-12-22 PROCEDURE — 88738 HGB QUANT TRANSCUTANEOUS: CPT

## 2021-12-22 PROCEDURE — ZZZZZ: CPT

## 2021-12-22 PROCEDURE — 94060 EVALUATION OF WHEEZING: CPT

## 2021-12-22 NOTE — PHYSICAL EXAM
[No Acute Distress] : no acute distress [Normal Oropharynx] : normal oropharynx [I] : Mallampati Class: I [Normal Appearance] : normal appearance [Supple] : supple [No Neck Mass] : no neck mass [No JVD] : no jvd [Normal Rate/Rhythm] : normal rate/rhythm [Normal S1, S2] : normal s1, s2 [No Murmurs] : no murmurs [No Resp Distress] : no resp distress [No Acc Muscle Use] : no acc muscle use [Normal Palpation] : normal palpation [Normal Rhythm and Effort] : normal rhythm and effort [Clear to Auscultation Bilaterally] : clear to auscultation bilaterally [Normal to Percussion] : normal to percussion [No Abnormalities] : no abnormalities [Benign] : benign [Not Tender] : not tender [Soft] : soft [No HSM] : no hsm [Normal Bowel Sounds] : normal bowel sounds [Normal Gait] : normal gait [No Clubbing] : no clubbing [No Cyanosis] : no cyanosis [No Edema] : no edema [FROM] : FROM [Normal Color/ Pigmentation] : normal color/ pigmentation [No Focal Deficits] : no focal deficits [Oriented x3] : oriented x3 [Normal Affect] : normal affect negative detailed exam

## 2021-12-22 NOTE — DISCUSSION/SUMMARY
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 cardiac ablation \par Chest tightness with exertional dyspnea\par Cardiac arrhythmias atrial fibrillation post EPS cardiac ablation November 11\par Management for pericarditis\par New findings elevated right hemidiaphragm-rule out traumatic injury rule out paralysis-this can account for the sensation of chest tightness and contribute to shortness of breath\par History asthma\par Clinical exam does not demonstrate audible wheezing\par Post COVID-19 infection\par \par Recommendations\par CT chest completed\par Sniff test pedning\par inhaled corticosteroids\par Office follow-up

## 2021-12-22 NOTE — PROCEDURE
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\par with severe gas exchange impairment\par HGB13.4\par \par NIOX 9 ppb WNL 12/22/21\par \par Pulmonary 6  minute walk stress test  12/22/21\par  Pos RA study srini deat 87 % at minute # 1 \par on O2 2 liters 88-89 %\par  Recovery 99 %\par \par Chest x-ray PA lateral December 8, 2021\par Normal cardiac size\par Mild elevation right hemidiaphragm\par Lung fields are otherwise clear without parenchymal infiltrates pleural effusions or dominant pulmonary nodules\par Impression elevation right hemidiaphragm\par \par Chest x-ray of August 31, 2021 single view\par Normal heart size clear lungs no pleural effusions no pneumothorax\par No evidence for elevation of the right heminoted on the July 28, 2021 chest x-ray at [facility] low lung volumes with poor inspiratory effort with suggestion of patchy airspace disease likely atelectatic\par

## 2021-12-22 NOTE — HISTORY OF PRESENT ILLNESS
[Former] : former [< 30 pack-years] : < 30 pack-years [TextBox_4] : 71-year-old urgent pulmonary evaluation\par Referral Dr. Stafford\par Chief complaint shortness of breath dyspnea on exertion\par Chest tightness patient has a prior longstanding history of asthma without recent exacerbations or flareups\par Remote history of bronchitis\par She is status post a cardiac ablation November 11, 2021 will atrial fibrillation possibly complicated by pericarditis and currently on colchicine\par She states she had emergency room visits where she was treated with IV metoprolol to slow her heart rate\par At present she is in normal sinus rhythm\par She states she was diagnosed with COVID-19 infection August 27, 2021 which was very mild and did not require treatment\par She did not require hospitalization for the Covid infection, did not receive monoclonal antibody or other treatment protocol\par She is a former smoker with a less than 30-year pack history\par No history of chemical toxic inhalational exposures\par Noted that she is on anticoagulation Eliquis for the cardiac arrhythmias\par She remains on oral beta-blocker\par Other medications reviewed\par \par  [TextBox_11] : 1 1/2 [YearQuit] : 1995 [TextBox_29] : 14 year tobacco use

## 2021-12-25 PROBLEM — Z98.890 S/P ABLATION OF ATRIAL FIBRILLATION: Status: RESOLVED | Noted: 2021-12-25 | Resolved: 2021-12-25

## 2021-12-25 NOTE — DISCUSSION/SUMMARY
[FreeTextEntry1] : Patient had chest discomfort which likely is pericardial.  Her symptoms has been improving.  Her blood pressure is normal.  She is tolerating current medications include colchicine.\par \par We will continue her on colchicine for approximately 2 weeks and then reassess.  Her examination today was unremarkable and there was no rub.  Her EKG also did not show any acute features.\par \par We will continue on current medications and follow-up evaluation by telephone within a few days. \par \par We will obtain follow-up echocardiogram if she develops any further symptoms related to pericarditis.\par \par

## 2021-12-25 NOTE — PHYSICAL EXAM
[No Acute Distress] : no acute distress [Normal Conjunctiva] : normal conjunctiva [Normal Venous Pressure] : normal venous pressure [Normal S1, S2] : normal S1, S2 [No Murmur] : no murmur [No Rub] : no rub [Non Tender] : non-tender [Normal Gait] : normal gait [No Edema] : no edema [Normal] : alert and oriented, normal memory [Clear Lung Fields] : clear lung fields [No Rash] : no rash [No Focal Deficits] : no focal deficits [Alert and Oriented] : alert and oriented [de-identified] : Femoral access site: No hematoma or bruit

## 2021-12-25 NOTE — HISTORY OF PRESENT ILLNESS
[FreeTextEntry1] : Patient is a 71-year-old woman seen in follow-up evaluation after catheter ablation procedure for atrial fibrillation November 7, 2021.  Post procedure she had chest pain that was positional/respiratory and was felt to be pericardial in nature.  She was started on colchicine with some improvement in symptoms.  Patient still continues to have discomfort especially when certain positions and breathing but is significantly improved.  She has also had mild shortness of breath.  She denies any increase with exertion.  She has no cough, fever, difficulty swallowing, dizziness, lightheadedness, abdominal pain, diarrhea nausea or vomiting.  She also denied any headaches or visual symptoms.  \par   She had pulmonary vein isolation as well as a cavotricuspid isthmus ablation.\par The patient is currently on colchicine 0.6 mg daily.  In addition she is on Carafate.  Patient is on Eliquis 5 mg twice daily.  She is on flecainide 50 mg twice daily and metoprolol 25 mg twice daily.\par There is no prior history of diabetes, TIA or stroke.  She has had a prior history of hypertension.  CAD with previous MI.

## 2021-12-25 NOTE — PHYSICAL EXAM
[No Acute Distress] : no acute distress [Normal Conjunctiva] : normal conjunctiva [Normal Venous Pressure] : normal venous pressure [Normal S1, S2] : normal S1, S2 [No Murmur] : no murmur [No Rub] : no rub [Clear Lung Fields] : clear lung fields [Non Tender] : non-tender [Normal Gait] : normal gait [No Edema] : no edema [No Rash] : no rash [No Focal Deficits] : no focal deficits [Normal] : alert and oriented, normal memory [Alert and Oriented] : alert and oriented [de-identified] : Femoral access site: No hematoma or bruit

## 2021-12-25 NOTE — REVIEW OF SYSTEMS
[Feeling Fatigued] : feeling fatigued [SOB] : shortness of breath [Chest Discomfort] : chest discomfort [Under Stress] : under stress [Weight Gain (___ Lbs)] : no recent weight gain [Blurry Vision] : no blurred vision [Sore Throat] : no sore throat [Dyspnea on exertion] : not dyspnea during exertion [Palpitations] : no palpitations [Syncope] : no syncope [Cough] : no cough [Abdominal Pain] : no abdominal pain [Nausea] : no nausea [Vomiting] : no vomiting [Heartburn] : no heartburn [Change in Appetite] : no change in appetite [Dysphagia] : no dysphagia [Diarrhea] : diarrhea [Dysuria] : no dysuria [Dizziness] : no dizziness [Easy Bleeding] : no tendency for easy bleeding

## 2021-12-25 NOTE — REVIEW OF SYSTEMS
[Under Stress] : under stress [Feeling Fatigued] : feeling fatigued [SOB] : shortness of breath [Chest Discomfort] : chest discomfort [Weight Gain (___ Lbs)] : no recent weight gain [Blurry Vision] : no blurred vision [Sore Throat] : no sore throat [Dyspnea on exertion] : not dyspnea during exertion [Palpitations] : no palpitations [Syncope] : no syncope [Cough] : no cough [Abdominal Pain] : no abdominal pain [Nausea] : no nausea [Vomiting] : no vomiting [Heartburn] : no heartburn [Change in Appetite] : no change in appetite [Dysphagia] : no dysphagia [Diarrhea] : diarrhea [Dysuria] : no dysuria [Dizziness] : no dizziness [Easy Bleeding] : no tendency for easy bleeding

## 2021-12-25 NOTE — DISCUSSION/SUMMARY
[FreeTextEntry1] : Patient had chest discomfort which likely is pericardial has significantly improved.  She remains in sinus rhythm.   Her blood pressure is normal.  She is tolerating current medications include colchicine.\par \par We will continue her on colchicine for another month and then reassess.  Her examination today was unremarkable and there was no rub.  Her EKG also did not show any acute features.\par \par Continue on current medications including anticoagulation.  Follow-up with cardiology as well as pulmonary.\par \par \par \par

## 2021-12-25 NOTE — HISTORY OF PRESENT ILLNESS
[FreeTextEntry1] : Patient is a 71-year-old woman who underwent catheter ablation for atrial fibrillation a week ago.  She had pulmonary vein isolation as well as a cavotricuspid isthmus ablation.  Patient did well and was discharged home in stable condition.  She complained of chest discomfort on inspiration and movement on the third post procedure day.  She was given nonsteroidal anti-inflammatory drug, started on colchicine 0.6 mg twice daily.  Her symptoms have significantly improved she still has residual discomfort.  She has no fever, cough, palpitations, abdominal pain, nausea, vomiting or any difficulty swallowing.  She has mild shortness of breath with exertion.  She has no dizziness, lightheadedness,  syncope or presyncope.\par \par The patient is currently on colchicine 0.6 mg daily.  In addition she is on Carafate.  Patient is on Eliquis 5 mg twice daily.  She is on flecainide 50 mg twice daily and metoprolol 25 mg twice daily.\par There is no prior history of diabetes, TIA or stroke.  She has had a prior history of hypertension.  CAD with previous MI.

## 2021-12-29 ENCOUNTER — APPOINTMENT (OUTPATIENT)
Dept: RADIOLOGY | Facility: HOSPITAL | Age: 71
End: 2021-12-29
Payer: MEDICARE

## 2021-12-29 ENCOUNTER — OUTPATIENT (OUTPATIENT)
Dept: OUTPATIENT SERVICES | Facility: HOSPITAL | Age: 71
LOS: 1 days | End: 2021-12-29
Payer: MEDICARE

## 2021-12-29 DIAGNOSIS — J98.6 DISORDERS OF DIAPHRAGM: ICD-10-CM

## 2021-12-29 DIAGNOSIS — Z98.890 OTHER SPECIFIED POSTPROCEDURAL STATES: Chronic | ICD-10-CM

## 2021-12-29 DIAGNOSIS — Z90.710 ACQUIRED ABSENCE OF BOTH CERVIX AND UTERUS: Chronic | ICD-10-CM

## 2021-12-29 PROCEDURE — 76000 FLUOROSCOPY <1 HR PHYS/QHP: CPT | Mod: 26

## 2021-12-29 PROCEDURE — 76000 FLUOROSCOPY <1 HR PHYS/QHP: CPT

## 2021-12-30 ENCOUNTER — NON-APPOINTMENT (OUTPATIENT)
Age: 71
End: 2021-12-30

## 2022-01-02 ENCOUNTER — NON-APPOINTMENT (OUTPATIENT)
Age: 72
End: 2022-01-02

## 2022-01-03 ENCOUNTER — NON-APPOINTMENT (OUTPATIENT)
Age: 72
End: 2022-01-03

## 2022-01-12 ENCOUNTER — NON-APPOINTMENT (OUTPATIENT)
Age: 72
End: 2022-01-12

## 2022-01-20 ENCOUNTER — NON-APPOINTMENT (OUTPATIENT)
Age: 72
End: 2022-01-20

## 2022-01-24 ENCOUNTER — APPOINTMENT (OUTPATIENT)
Dept: RADIOLOGY | Facility: HOSPITAL | Age: 72
End: 2022-01-24

## 2022-02-02 ENCOUNTER — APPOINTMENT (OUTPATIENT)
Dept: PULMONOLOGY | Facility: CLINIC | Age: 72
End: 2022-02-02
Payer: MEDICARE

## 2022-02-02 VITALS
TEMPERATURE: 98.3 F | SYSTOLIC BLOOD PRESSURE: 130 MMHG | DIASTOLIC BLOOD PRESSURE: 82 MMHG | OXYGEN SATURATION: 95 % | HEART RATE: 79 BPM

## 2022-02-02 PROCEDURE — 99214 OFFICE O/P EST MOD 30 MIN: CPT | Mod: CS

## 2022-02-02 RX ORDER — FLUTICASONE PROPIONATE 110 UG/1
110 AEROSOL, METERED RESPIRATORY (INHALATION)
Qty: 3 | Refills: 1 | Status: DISCONTINUED | COMMUNITY
Start: 2021-12-08 | End: 2022-02-02

## 2022-02-02 RX ORDER — PREDNISONE 10 MG/1
10 TABLET ORAL
Qty: 30 | Refills: 1 | Status: DISCONTINUED | COMMUNITY
Start: 2022-01-12 | End: 2022-02-02

## 2022-02-02 RX ORDER — FLUTICASONE PROPIONATE 110 UG/1
110 AEROSOL, METERED RESPIRATORY (INHALATION)
Qty: 1 | Refills: 3 | Status: DISCONTINUED | COMMUNITY
Start: 2021-12-22 | End: 2022-02-02

## 2022-02-02 RX ORDER — COLCHICINE 0.6 MG/1
0.6 TABLET ORAL DAILY
Qty: 30 | Refills: 0 | Status: DISCONTINUED | COMMUNITY
Start: 2021-11-13 | End: 2022-02-02

## 2022-02-02 NOTE — HISTORY OF PRESENT ILLNESS
[Former] : former [< 30 pack-years] : < 30 pack-years [TextBox_4] : Unilateral right hemidiaphragm paralysis \par post bronchitis\par feels neck choking\par pos hx PRIYA not on tx\par \par  pulmonary evaluation\par Referral Dr. Stafford\par Chief complaint shortness of breath dyspnea on exertion\par Chest tightness patient has a prior longstanding history of asthma without recent exacerbations or flareups\par Remote history of bronchitis\par She is status post a cardiac ablation November 11, 2021 will atrial fibrillation possibly complicated by pericarditis and currently on colchicine\par She states she had emergency room visits where she was treated with IV metoprolol to slow her heart rate\par At present she is in normal sinus rhythm\par She states she was diagnosed with COVID-19 infection August 27, 2021 which was very mild and did not require treatment\par She did not require hospitalization for the Covid infection, did not receive monoclonal antibody or other treatment protocol\par She is a former smoker with a less than 30-year pack history\par No history of chemical toxic inhalational exposures\par Noted that she is on anticoagulation Eliquis for the cardiac arrhythmias\par She remains on oral beta-blocker\par Other medications reviewed\par \par  [TextBox_11] : 1 1/2 [YearQuit] : 1995 [TextBox_29] : 14 year tobacco use

## 2022-02-02 NOTE — PROCEDURE
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\par with severe gas exchange impairment\par HGB13.4\par \par NIOX 9 ppb WNL 12/22/21\par \par Pulmonary 6  minute walk stress test  12/22/21\par  Pos RA study srini deat 87 % at minute # 1 \par on O2 2 liters 88-89 %\par  Recovery 99 %\par \par Chest x-ray PA lateral December 8, 2021\par Normal cardiac size\par Mild elevation right hemidiaphragm\par Lung fields are otherwise clear without parenchymal infiltrates pleural effusions or dominant pulmonary nodules\par Impression elevation right hemidiaphragm\par \par Chest x-ray of August 31, 2021 single view\par Normal heart size clear lungs no pleural effusions no pneumothorax\par No evidence for elevation of the right heminoted on the July 28, 2021 chest x-ray at [facility] low lung volumes with poor inspiratory effort with suggestion of patchy airspace disease likely atelectatic\par

## 2022-02-02 NOTE — DISCUSSION/SUMMARY
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 cardiac ablation diaphgram parlysisi\par Chest tightness with exertional dyspnea\par Cardiac arrhythmias atrial fibrillation post EPS cardiac ablation November 11\par Management for pericarditis\par New findings elevated right hemidiaphragm-rule out traumatic injury rule out paralysis-this can account for the sensation of chest tightness and contribute to shortness of breath\par History asthma\par Deconditioning with overweight\par Clinical exam does not demonstrate audible wheezing\par Post COVID-19 infection\par \par Recommendations\par note on BB\par CT chest completed\par Sniff test reviewed\par inhaled corticosteroids- Change to symbicort\par HSS\par Office follow-up

## 2022-03-07 ENCOUNTER — APPOINTMENT (OUTPATIENT)
Dept: ELECTROPHYSIOLOGY | Facility: CLINIC | Age: 72
End: 2022-03-07

## 2022-03-17 DIAGNOSIS — Z01.812 ENCOUNTER FOR PREPROCEDURAL LABORATORY EXAMINATION: ICD-10-CM

## 2022-03-25 ENCOUNTER — APPOINTMENT (OUTPATIENT)
Dept: PULMONOLOGY | Facility: CLINIC | Age: 72
End: 2022-03-25
Payer: MEDICARE

## 2022-03-25 VITALS — BODY MASS INDEX: 31.35 KG/M2 | OXYGEN SATURATION: 97 % | WEIGHT: 177 LBS | HEART RATE: 74 BPM

## 2022-03-25 DIAGNOSIS — R09.02 HYPOXEMIA: ICD-10-CM

## 2022-03-25 LAB — POCT - HEMOGLOBIN (HGB), QUANTITATIVE, TRANSCUTANEOUS: 13.6

## 2022-03-25 PROCEDURE — 94618 PULMONARY STRESS TESTING: CPT

## 2022-03-25 PROCEDURE — 94010 BREATHING CAPACITY TEST: CPT

## 2022-03-25 PROCEDURE — 94727 GAS DIL/WSHOT DETER LNG VOL: CPT

## 2022-03-25 PROCEDURE — 99214 OFFICE O/P EST MOD 30 MIN: CPT | Mod: CS,25

## 2022-03-25 PROCEDURE — 94729 DIFFUSING CAPACITY: CPT

## 2022-03-25 PROCEDURE — 88738 HGB QUANT TRANSCUTANEOUS: CPT

## 2022-03-25 PROCEDURE — 71046 X-RAY EXAM CHEST 2 VIEWS: CPT

## 2022-03-25 PROCEDURE — 95012 NITRIC OXIDE EXP GAS DETER: CPT

## 2022-03-25 NOTE — HISTORY OF PRESENT ILLNESS
[Former] : former [TextBox_4] : Unilateral right hemidiaphragm paralysis \par post bronchitis\par feels neck choking\par pos hx PRIYA not on tx\par SOB with exercise\par but overall  better\par \par  pulmonary evaluation\par Referral Dr. Stafford\par Chief complaint shortness of breath dyspnea on exertion\par Chest tightness patient has a prior longstanding history of asthma without recent exacerbations or flareups\par Remote history of bronchitis\par She is status post a cardiac ablation November 11, 2021 will atrial fibrillation possibly complicated by pericarditis and currently on colchicine\par She states she had emergency room visits where she was treated with IV metoprolol to slow her heart rate\par At present she is in normal sinus rhythm\par She states she was diagnosed with COVID-19 infection August 27, 2021 which was very mild and did not require treatment\par She did not require hospitalization for the Covid infection, did not receive monoclonal antibody or other treatment protocol\par She is a former smoker with a less than 30-year pack history\par No history of chemical toxic inhalational exposures\par Noted that she is on anticoagulation Eliquis for the cardiac arrhythmias\par She remains on oral beta-blocker\par Other medications reviewed\par \par ENT\par ENT evaluation\par Chronic cough\par Endoscopic findings of laryngeal pharyngeal reflux\par Patient was started on famotidine 40 mg at bedtime with addition nasal astelin Flonase Prilosec 40 mg\par Reflux diet lifestyle modifications\par If symptoms do not improve noted that they will consider CT of the neck\par \par Cardiac\par Echocardiogram 1/31/2022\par Normal LV\par Normal RV\par Mild to moderate mitral regurgitation\par Moderate pulmonary hypertension with estimated pulmonary artery systolic pressure 51\par Prior pulmonary hypertension estimate 62 demonstrating some clinical improvement\par Address further work-up for pulmonary hypertension [TextBox_11] : 1 1/2 [YearQuit] : 1995 [TextBox_29] : 14 year tobacco use

## 2022-03-25 NOTE — REVIEW OF SYSTEMS
[Fatigue] : fatigue [Cough] : cough [Depression] : depression [Anxiety] : anxiety [Negative] : Endocrine [Fever] : no fever [Chills] : no chills [Diabetes] : no diabetes [Poor Appetite] : no poor appetite [Thyroid Problem] : no thyroid problem [TextBox_30] : hx asthma  [TextBox_44] : post ablation HPI

## 2022-03-25 NOTE — PROCEDURE
[FreeTextEntry1] : PFT 3/25/22\par Flow rates nl\par  Lung Volumes nl\par  DLCO 63 % with moderate loss fx  alveolar  capillary nits HGB 13.6\par Interval improvement of overall pulmonary physiology\par NIOX 15  ppb WNL\par \par Chest x-ray PA lateral March 25, 2022\par Cardiac size is normal\par Lung fields are clear\par No parenchymal infiltrates pleural effusions or dominant pulmonary nodules\par Mild elevation right hemidiaphragm\par Compared to chest x-ray dating back to 12/8/2021 patient improved overall prior noted volume loss\par Clinical  correlation\par \par Pulmonary 6-minute walk exercise study March 25, 2022\par Baseline room air O2 saturation 97%\par Srini desaturation to 93%\par Impression normal study\par Compared to prior study of December 22, 2021 this is significant interval improvement as patient previously demonstrated needed desaturation at minute #1-80 7%\par \par \par Chest CT 12/16/2021\par Elevated right hemidiaphragm new compared to August 31, 2021\par Mild passive atelectasis right lower and middle lobes\par No mediastinal adenopathy\par No tumors\par Mild coronary calcified plaque\par \par PFT 12/22/21\par Mild OAD\par No BD at FEV1\par TLC 72 % \par Resistance and sp conductance normal\par DLCO 46 % pred with severe gas exchange impairment\par IMP\par 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\par with severe gas exchange impairment\par HGB13.4\par \par NIOX 9 ppb WNL 12/22/21\par \par Pulmonary 6  minute walk stress test  12/22/21\par  Pos RA study srini deat 87 % at minute # 1 \par on O2 2 liters 88-89 %\par  Recovery 99 %\par \par Chest x-ray PA lateral December 8, 2021\par Normal cardiac size\par Mild elevation right hemidiaphragm\par Lung fields are otherwise clear without parenchymal infiltrates pleural effusions or dominant pulmonary nodules\par Impression elevation right hemidiaphragm\par \par Chest x-ray of August 31, 2021 single view\par Normal heart size clear lungs no pleural effusions no pneumothorax\par No evidence for elevation of the right heminoted on the July 28, 2021 chest x-ray at [facility] low lung volumes with poor inspiratory effort with suggestion of patchy airspace disease likely atelectatic\par

## 2022-03-25 NOTE — REASON FOR VISIT
[Follow-Up] : a follow-up visit [Asthma] : asthma [TextBox_44] : COVID 19 Infection, chest tightness

## 2022-03-25 NOTE — DISCUSSION/SUMMARY
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 cardiac ablation diaphgram paralysis\par Chest tightness with exertional dyspnea\par Cardiac arrhythmias atrial fibrillation post EPS cardiac ablation November 11\par Management for pericarditis\par New findings elevated right hemidiaphragm-rule out traumatic injury rule out paralysis-this can account for the sensation of chest tightness and contribute to shortness of breath\par History asthma\par Deconditioning with overweight\par Clinical exam does not demonstrate audible wheezing\par Post COVID-19 infection\par \par Recommendations\par note on BB\par CT chest completed\par Sniff test reviewed\par inhaled corticosteroids- Change to symbicort\par HSS\par Office follow-up 3 months with PFT 6 min walk and then discuss repeat SNIFF

## 2022-04-22 ENCOUNTER — APPOINTMENT (OUTPATIENT)
Dept: ELECTROPHYSIOLOGY | Facility: CLINIC | Age: 72
End: 2022-04-22
Payer: MEDICARE

## 2022-04-22 VITALS
SYSTOLIC BLOOD PRESSURE: 100 MMHG | HEIGHT: 63 IN | BODY MASS INDEX: 30.65 KG/M2 | DIASTOLIC BLOOD PRESSURE: 60 MMHG | HEART RATE: 67 BPM | OXYGEN SATURATION: 98 % | TEMPERATURE: 97.7 F | WEIGHT: 173 LBS

## 2022-04-22 PROCEDURE — 93000 ELECTROCARDIOGRAM COMPLETE: CPT

## 2022-04-22 PROCEDURE — 99214 OFFICE O/P EST MOD 30 MIN: CPT

## 2022-04-22 RX ORDER — PANTOPRAZOLE 40 MG/1
40 TABLET, DELAYED RELEASE ORAL DAILY
Refills: 0 | Status: DISCONTINUED | COMMUNITY
End: 2022-04-22

## 2022-04-22 RX ORDER — PREDNISONE 10 MG/1
10 TABLET ORAL
Qty: 30 | Refills: 0 | Status: DISCONTINUED | COMMUNITY
Start: 2022-01-03 | End: 2022-04-22

## 2022-04-22 RX ORDER — SUCRALFATE 1 G/1
1 TABLET ORAL TWICE DAILY
Refills: 0 | Status: DISCONTINUED | COMMUNITY
End: 2022-04-22

## 2022-05-01 ENCOUNTER — NON-APPOINTMENT (OUTPATIENT)
Age: 72
End: 2022-05-01

## 2022-05-01 NOTE — PHYSICAL EXAM
[No Acute Distress] : no acute distress [Normal Conjunctiva] : normal conjunctiva [Normal Venous Pressure] : normal venous pressure [Normal S1, S2] : normal S1, S2 [No Murmur] : no murmur [No Rub] : no rub [Clear Lung Fields] : clear lung fields [Non Tender] : non-tender [Normal Gait] : normal gait [No Edema] : no edema [No Rash] : no rash [No Focal Deficits] : no focal deficits [Normal] : alert and oriented, normal memory [Alert and Oriented] : alert and oriented [de-identified] : Femoral access site: No hematoma or bruit

## 2022-05-01 NOTE — DISCUSSION/SUMMARY
[FreeTextEntry1] : Patient is doing well from atrial fibrillation standpoint without any evidence of recurrent A. fib.  We will continue her on her current medications for now including low-dose flecainide, metoprolol and anticoagulation.\par \par Regarding her shortness of breath this is improving.  She will get reassessment of her right diaphragm function.  The pulmonary hypertension is also improving.  She will get repeat measurements and if still elevated would consider CT scan of the pulmonary veins.\par \par Recommend follow-up evaluation with electrophysiology in about 4 to 6 months.  Will discuss with her cardiologist Dr. Gomez as well as her pulmonologist Dr. Solis.

## 2022-05-01 NOTE — REVIEW OF SYSTEMS
[Weight Gain (___ Lbs)] : no recent weight gain [Feeling Fatigued] : feeling fatigued [Blurry Vision] : no blurred vision [Sore Throat] : no sore throat [SOB] : shortness of breath [Dyspnea on exertion] : not dyspnea during exertion [Chest Discomfort] : no chest discomfort [Palpitations] : no palpitations [Orthopnea] : no orthopnea [PND] : no PND [Syncope] : no syncope [Cough] : no cough [Abdominal Pain] : no abdominal pain [Nausea] : no nausea [Vomiting] : no vomiting [Heartburn] : no heartburn [Change in Appetite] : no change in appetite [Dysphagia] : no dysphagia [Diarrhea] : diarrhea [Dysuria] : no dysuria [Dizziness] : no dizziness [Under Stress] : under stress [Easy Bleeding] : no tendency for easy bleeding

## 2022-05-01 NOTE — HISTORY OF PRESENT ILLNESS
[FreeTextEntry1] : Patient is a 71-year-old woman seen in follow-up evaluation status post catheter ablation for atrial fibrillation December 2021.    She had pulmonary vein isolation as well as a cavotricuspid isthmus ablation.  She has not had any recurrence of palpitations or evidence of recurrent atrial fibrillation.  Patient is currently on flecainide 50 mg twice daily as well as Eliquis 5 mg twice daily.  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  The ablation procedure was reviewed and ablation was performed and a wide area away from phrenic nerve.  She had preceding COVID infection.  The patient had had shortness of breath after the ablation procedure and prior to the ablation procedure as well.  Her symptoms have been improving.  She is followed by pulmonary and repeat testing of the diaphragm is pending.  She also had evidence of pulmonary hypertension that is improved.\par \par \par There is no prior history of CAD/MI/heart failure, diabetes, TIA or stroke.  She has had a prior history of hypertension.

## 2022-05-17 ENCOUNTER — APPOINTMENT (OUTPATIENT)
Dept: ORTHOPEDIC SURGERY | Facility: CLINIC | Age: 72
End: 2022-05-17
Payer: MEDICARE

## 2022-05-17 VITALS — HEIGHT: 63 IN | WEIGHT: 173 LBS | BODY MASS INDEX: 30.65 KG/M2

## 2022-05-17 DIAGNOSIS — E78.00 PURE HYPERCHOLESTEROLEMIA, UNSPECIFIED: ICD-10-CM

## 2022-05-17 PROCEDURE — 20610 DRAIN/INJ JOINT/BURSA W/O US: CPT

## 2022-05-17 PROCEDURE — J3490M: CUSTOM

## 2022-05-17 PROCEDURE — 99214 OFFICE O/P EST MOD 30 MIN: CPT | Mod: 25

## 2022-05-17 NOTE — DISCUSSION/SUMMARY
[de-identified] : The natural progression of Osteoarthritis was explained to the patient.  We discussed the possible treatment options from conservative to operative.  These included NSAIDS, Glucosamine and Chondrotin sulfate, Physical Therapy and injections.  We also discussed that at some point they may progress to needing a TKA.\par

## 2022-05-17 NOTE — HISTORY OF PRESENT ILLNESS
[5] : 5 [Dull/Aching] : dull/aching [Localized] : localized [Intermittent] : intermittent [Standing] : standing [Walking] : walking [Stairs] : stairs [Retired] : Work status: retired [de-identified] : 5/17/22: Zilretta left knee helped a lot but pain returned 2 weeks ago.\par \par Previous doc:\par 2/12/21- She is for evaluation chronic b/l knee pain\par The Left knee she has been told she had bone on bone OA cortisone no help, She completed series of 3 Ha injections not sure of the brand did help for more than one year. Over the last few months pain has been returning. Pain with start up, and down stairs.\par The right knee several month history of episodic laterally based right knee pain with prolonged standing.\par PMH: Afib (ellaquis) can not take nsaids\par htn, depression\par retired\par 2/22/21: Euflexxa #2 b/l knees.\par 3/1/21: Euflexxa #3 b/l knees.\par 3/30/21: 4 weeks s/p bilateral euflexxa injections. Right knee significantly improved but left knee continues to have significant pain.\par 4/13/21: Followed today for MRI of the left knee- with continued pain and symptoms.\par 7/30 - return of swelling and pain in the left knee no sig pain in rt knee\par 11/19/21: Left knee pain returned 2 weeks ago, no injury. Had good relief from zilretta until now. [] : Post Surgical Visit: no [FreeTextEntry1] : left knee [FreeTextEntry5] : here for follow up of the left knee\par Pt still complains of buckling, swelling, stiffness

## 2022-05-17 NOTE — IMAGING
[de-identified] : left knee: \par pain med and lateral joint line \par Crepitus\par NVI\par Strenth 5/5\par Pulses +2 DP/PT\par Decreased ROM -\par

## 2022-05-17 NOTE — ASSESSMENT
[FreeTextEntry1] : Previous doc:\par we discussed her options including mri cortisone and ha injections. discussed arthroscopy would likely not help her. could do therapy as well for her buckling on the right knee\par 2/22/21: Inj tolerated well.\par 3/1/21: Inj tolerated well.\par 3/30/21: with really good results on the right knee and poor results on left and continued large effusion recommend to get MRI of left knee to evaluate for meniscus tear.\par 4/13/21: MRI of the left knee showing medial meniscus root tear with some tricompartment OA. With less OA in the right knee but significantly more symptomatic then the right knee. Has minimal medial pain but significant posterior pain today, will like try one more injection. Had little relief from cortisone injection, will try Zilretta and PT before proceeding with surgical intervention.\par 7/30/21 -retrunof symptom s- likely more Meniscal than OA at this point but unsure - I will try asp inj again as she has some medical issues as well as helping her mom but if retruns likely suggest arthrhosocpy\par 11/19/21: Repeat zilretta left knee today tolerated well.\par 2/22/22: Progression to adv OA left knee - zilretta today tolerated well, asp 40cc. Discussed left TKA when she is ready. Too much damage for scope, has some ant knee pain as well.\par \par 5/17/22: Too soon for repeat zilretta.  Cortisone inj today tolerated well, will return next month after her vacation to discuss repeat zilretta vs TKA.

## 2022-05-31 ENCOUNTER — NON-APPOINTMENT (OUTPATIENT)
Age: 72
End: 2022-05-31

## 2022-06-07 ENCOUNTER — APPOINTMENT (OUTPATIENT)
Dept: ORTHOPEDIC SURGERY | Facility: CLINIC | Age: 72
End: 2022-06-07
Payer: MEDICARE

## 2022-06-07 VITALS — BODY MASS INDEX: 30.65 KG/M2 | HEIGHT: 63 IN | WEIGHT: 173 LBS

## 2022-06-07 PROCEDURE — 99215 OFFICE O/P EST HI 40 MIN: CPT | Mod: 25

## 2022-06-07 PROCEDURE — 20610 DRAIN/INJ JOINT/BURSA W/O US: CPT

## 2022-06-07 NOTE — DISCUSSION/SUMMARY
[de-identified] : The natural progression of Osteoarthritis was explained to the patient.  We discussed the possible treatment options from conservative to operative.  These included NSAIDS, Glucosamine and Chondrotin sulfate, and Physical Therapy as well different types of injections.  We also discussed that at some point they may progress to needed a TKA.  Information and pamphlets were given when appropriate.\par \par Patient Complains of pain in Knee with a level that often reaches greater than a 8/10. The Pain has been\par progressively worsening of his/her treatment coarse. The pain has interfered with their ADLs and worsens with\par weight bearing. On exam they often have episodes of swelling/effusion with limited ROM. Pain worsens with ROM\par passive and active and I can palpate crepitus.\par  X rays were reviewed with the patient and they show joint space narrowing, subchondral sclerosis,\par osteophyte formation, and subchondral cysts.\par  After a period of more than 12 weeks physical therapy or exercise program done with me or another\par treating physician they have continued pain. The patient has failed a trial of NSAID medication or pain relieves if\par they were unable to tolerate NSAID medications as well as a series of injection, steroid or Hyaluronic Acid. After a\par long discussion with the patient both the patient and I have decided we have exhausted all forms of less radical\par treatments and they would like to proceed with Total Knee Replacement\par \par We discussed my findings and the natural history of their condition.  We talked about the details of the proposed surgery and the recovery.  We discussed the material risks, possible benefits and alternatives to surgery.  The risks include but are not limited to infection, bleeding and possible need for blood transfusion, fracture, bowel blockage, bladder retention or infection, need for reoperation, stiffness and/or limited range of motion, possible damage to nerves and blood vessels, failure of fixation of components, risk of deep vein thromboses and pulmonary embolism, wound healing problems, dislocation, and possible leg length discrepancy.  Although incredibly rare, we also discussed the risks of a cardiac event, stroke and even death during, or following, the surgery.  We discussed the type of implants the patient will be receiving and the type of fixation that will be used, as well as whether a robot or computer navigation aide will be used.  The patient understands they will need medical clearance and will attend a preoperative joint education class.  We also discussed the type of anesthesia they will receive, and the risks associated with hospital or rehab length of stay, obesity, diabetes and smoking.\par

## 2022-06-07 NOTE — ASSESSMENT
[FreeTextEntry1] : Previous doc:\par we discussed her options including mri cortisone and ha injections. discussed arthroscopy would likely not help her. could do therapy as well for her buckling on the right knee\par 2/22/21: Inj tolerated well.\par 3/1/21: Inj tolerated well.\par 3/30/21: with really good results on the right knee and poor results on left and continued large effusion recommend to get MRI of left knee to evaluate for meniscus tear.\par 4/13/21: MRI of the left knee showing medial meniscus root tear with some tricompartment OA. With less OA in the right knee but significantly more symptomatic then the right knee. Has minimal medial pain but significant posterior pain today, will like try one more injection. Had little relief from cortisone injection, will try Zilretta and PT before proceeding with surgical intervention.\par 7/30/21 -retrunof symptom s- likely more Meniscal than OA at this point but unsure - I will try asp inj again as she has some medical issues as well as helping her mom but if retruns likely suggest arthrhosocpy\par 11/19/21: Repeat zilretta left knee today tolerated well.\par 2/22/22: Progression to adv OA left knee - zilretta today tolerated well, asp 40cc. Discussed left TKA when she is ready. Too much damage for scope, has some ant knee pain as well.\par 5/17/22: Too soon for repeat zilretta.  Cortisone inj today tolerated well, will return next month after her vacation to discuss repeat zilretta vs TKA.\par \par 6/7/22 she has a lot happening with her mom so has to delay surgery but thinking in 4-5 months- she is still on eliquis so cannot take NSAIDs - we will try another zillretta inj to day and plan for surgery in sept/oct -  \par \par \par

## 2022-06-07 NOTE — PROCEDURE
[Left] : of the left [Knee] : knee [Pain] : pain [Inflammation] : inflammation [X-ray evidence of Osteoarthritis on this or prior visit] : x-ray evidence of Osteoarthritis on this or prior visit [Alcohol] : alcohol [Betadine] : betadine [Ethyl Chloride sprayed topically] : ethyl chloride sprayed topically [___ cc    1%] : Lidocaine ~Vcc of 1%  [___ cc    32 units 5mg] : Zilretta ~Vcc of 32 units 5 mg  [Effusion] : effusion [] : Patient tolerated procedure well [Call if redness, pain or fever occur] : call if redness, pain or fever occur [Apply ice for 15min out of every hour for the next 12-24 hours as tolerated] : apply ice for 15 minutes out of every hour for the next 12-24 hours as tolerated [Patient was advised to rest the joint(s) for ____ days] : patient was advised to rest the joint(s) for [unfilled] days [All ultrasound images have been permanently captured and stored accordingly in our picture archiving and communication system] : All ultrasound images have been permanently captured and stored accordingly in our picture archiving and communication system [FreeTextEntry3] : Large joint injection was performed on the left knee. The indication for this procedure was pain, inflammation, and x-ray evidence of Osteoarthritis on this or prior visit. The site was prepped with betadine, ethyl chloride sprayed topically, and sterile technique used. An injection of Lidocaine 3cc of 1% , Bupivacaine (Marcaine) 5cc of 0.25% , Methylprednisolone (Depomedrol) cc of 80 mg was used. Patient was advised to call if redness, pain, or fever occur, apply ice for 15 minutes out of every hour for the next 12-24 hours as tolerated and patient was advised to rest the joint(s) for days.\par Patient has tried OTC's including aspirin, Ibuprofen, Aleve, etc or prescription NSAIDS, and/or exercises at home and/or physical therapy without satisfactory response and patient had decreased mobility in the joint. Ultrasound guidance was indicated for this patient due to better visualize joint space. All ultrasound images have been permanently captured and stored accordingly in our picture.\par

## 2022-06-07 NOTE — HISTORY OF PRESENT ILLNESS
[2] : 2 [Dull/Aching] : dull/aching [Localized] : localized [Intermittent] : intermittent [Walking] : walking [Stairs] : stairs [Retired] : Work status: retired [de-identified] : 6/7/22 more than 3 months from Zillretta - depo did no sig help -  overall knee pain is worse and feels unsafe and leg giving out on her  \par \par Previous doc:\par 2/12/21- She is for evaluation chronic b/l knee pain\par The Left knee she has been told she had bone on bone OA cortisone no help, She completed series of 3 Ha injections not sure of the brand did help for more than one year. Over the last few months pain has been returning. Pain with start up, and down stairs.\par The right knee several month history of episodic laterally based right knee pain with prolonged standing.\par PMH: Afib (ellaquis) can not take nsaids\par htn, depression\par retired\par 2/22/21: Euflexxa #2 b/l knees.\par 3/1/21: Euflexxa #3 b/l knees.\par 3/30/21: 4 weeks s/p bilateral euflexxa injections. Right knee significantly improved but left knee continues to have significant pain.\par 4/13/21: Followed today for MRI of the left knee- with continued pain and symptoms.\par 7/30 - return of swelling and pain in the left knee no sig pain in rt knee\par 11/19/21: Left knee pain returned 2 weeks ago, no injury. Had good relief from zilretta until now.\par 5/17/22: Zilretta left knee helped a lot but pain returned 2 weeks ago. [] : Post Surgical Visit: no [FreeTextEntry1] : left knee [FreeTextEntry5] : here for follow up\par Pt complains of buckling when walking and going up the stairs

## 2022-06-07 NOTE — IMAGING
[de-identified] : left knee: \par pain med and lateral joint line \par Crepitus\par NVI\par Strenth 5/5\par Pulses +2 DP/PT\par Decreased ROM -\par moderate effusion \par

## 2022-06-29 ENCOUNTER — APPOINTMENT (OUTPATIENT)
Dept: PULMONOLOGY | Facility: CLINIC | Age: 72
End: 2022-06-29

## 2022-06-29 VITALS
WEIGHT: 165 LBS | OXYGEN SATURATION: 96 % | BODY MASS INDEX: 29.23 KG/M2 | DIASTOLIC BLOOD PRESSURE: 70 MMHG | HEART RATE: 67 BPM | SYSTOLIC BLOOD PRESSURE: 116 MMHG

## 2022-06-29 DIAGNOSIS — J98.6 DISORDERS OF DIAPHRAGM: ICD-10-CM

## 2022-06-29 PROCEDURE — 99214 OFFICE O/P EST MOD 30 MIN: CPT | Mod: CS,25

## 2022-06-29 PROCEDURE — 94727 GAS DIL/WSHOT DETER LNG VOL: CPT

## 2022-06-29 PROCEDURE — ZZZZZ: CPT

## 2022-06-29 PROCEDURE — 94729 DIFFUSING CAPACITY: CPT

## 2022-06-29 PROCEDURE — 94010 BREATHING CAPACITY TEST: CPT

## 2022-06-29 PROCEDURE — 71046 X-RAY EXAM CHEST 2 VIEWS: CPT

## 2022-06-29 NOTE — PROCEDURE
[FreeTextEntry1] : PFT 6/29/22\par flow rate snl flow\par   %\par  DLCO 69 % just below nl range\par HGB 13.4\par \par Chest x-ray PA lateral\par Cardiac size normal\par Clear lung fields no evidence for parenchymal infiltrates pleural effusions or dominant pulmonary nodules\par Diaphragm appears grossly normal in size without any significant volume loss\par In comparison to a prior chest x-ray of December 8, 2021 there is significant improvement of the prior noted elevated right hemidiaphragm\par \par PFT 3/25/22\par Flow rates nl\par  Lung Volumes nl\par  DLCO 63 % with moderate loss fx  alveolar  capillary nits HGB 13.6\par Interval improvement of overall pulmonary physiology\par NIOX 15  ppb WNL\par \par Chest x-ray PA lateral March 25, 2022\par Cardiac size is normal\par Lung fields are clear\par No parenchymal infiltrates pleural effusions or dominant pulmonary nodules\par Mild elevation right hemidiaphragm\par Compared to chest x-ray dating back to 12/8/2021 patient improved overall prior noted volume loss\par Clinical  correlation\par \par Pulmonary 6-minute walk exercise study March 25, 2022\par Baseline room air O2 saturation 97%\par Srini desaturation to 93%\par Impression normal study\par Compared to prior study of December 22, 2021 this is significant interval improvement as patient previously demonstrated needed desaturation at minute #1-80 7%\par \par \par Chest CT 12/16/2021\par Elevated right hemidiaphragm new compared to August 31, 2021\par Mild passive atelectasis right lower and middle lobes\par No mediastinal adenopathy\par No tumors\par Mild coronary calcified plaque\par \par PFT 12/22/21\par Mild OAD\par No BD at FEV1\par TLC 72 % \par Resistance and sp conductance normal\par DLCO 46 % pred with severe gas exchange impairment\par IMP\par 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\par with severe gas exchange impairment\par HGB13.4\par \par NIOX 9 ppb WNL 12/22/21\par \par Pulmonary 6  minute walk stress test  12/22/21\par  Pos RA study srini deat 87 % at minute # 1 \par on O2 2 liters 88-89 %\par  Recovery 99 %\par \par Chest x-ray PA lateral December 8, 2021\par Normal cardiac size\par Mild elevation right hemidiaphragm\par Lung fields are otherwise clear without parenchymal infiltrates pleural effusions or dominant pulmonary nodules\par Impression elevation right hemidiaphragm\par \par Chest x-ray of August 31, 2021 single view\par Normal heart size clear lungs no pleural effusions no pneumothorax\par No evidence for elevation of the right heminoted on the July 28, 2021 chest x-ray at [facility] low lung volumes with poor inspiratory effort with suggestion of patchy airspace disease likely atelectatic\par

## 2022-06-29 NOTE — DISCUSSION/SUMMARY
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 cardiac ablation diaphgram paralysis\par Chest tightness with exertional dyspnea\par Cardiac arrhythmias atrial fibrillation post EPS cardiac ablation November 11\par Management for pericarditis\par New findings elevated right hemidiaphragm-rule out traumatic injury rule out paralysis-this can account for the sensation of chest tightness and contribute to shortness of breath\par History asthma\par Deconditioning with overweight\par Clinical exam does not demonstrate audible wheezing\par Post COVID-19 infection\par \par Recommendations\par note on BB\par CT chest completed\par Sniff test reviewed\par inhaled corticosteroids- Change to symbicort\par HSS\par Office follow-up 3 months with PFT 6 min walk and then discuss repeat SNIFF

## 2022-06-29 NOTE — HISTORY OF PRESENT ILLNESS
[Former] : former [TextBox_4] : Post second COVID infection\par  completed PAxlovid no residual sxs\par  COVID Vaccine x2 with booster x 2\par \par Unilateral right hemidiaphragm paralysis \par post bronchitis\par feels neck choking\par pos hx PRIYA not on tx\par SOB with exercise\par but overall  better\par \par  pulmonary evaluation\par Referral Dr. Stafford\par Chief complaint shortness of breath dyspnea on exertion\par Chest tightness patient has a prior longstanding history of asthma without recent exacerbations or flareups\par Remote history of bronchitis\par She is status post a cardiac ablation November 11, 2021 will atrial fibrillation possibly complicated by pericarditis and currently on colchicine\par She states she had emergency room visits where she was treated with IV metoprolol to slow her heart rate\par At present she is in normal sinus rhythm\par She states she was diagnosed with COVID-19 infection August 27, 2021 which was very mild and did not require treatment\par She did not require hospitalization for the Covid infection, did not receive monoclonal antibody or other treatment protocol\par She is a former smoker with a less than 30-year pack history\par No history of chemical toxic inhalational exposures\par Noted that she is on anticoagulation Eliquis for the cardiac arrhythmias\par She remains on oral beta-blocker\par Other medications reviewed\par \par ENT\par ENT evaluation\par Chronic cough\par Endoscopic findings of laryngeal pharyngeal reflux\par Patient was started on famotidine 40 mg at bedtime with addition nasal astelin Flonase Prilosec 40 mg\par Reflux diet lifestyle modifications\par If symptoms do not improve noted that they will consider CT of the neck\par \par Cardiac\par Echocardiogram 1/31/2022\par Normal LV\par Normal RV\par Mild to moderate mitral regurgitation\par Moderate pulmonary hypertension with estimated pulmonary artery systolic pressure 51\par Prior pulmonary hypertension estimate 62 demonstrating some clinical improvement\par Address further work-up for pulmonary hypertension [TextBox_11] : 1 1/2 [YearQuit] : 1995 [TextBox_29] : 14 year tobacco use

## 2022-08-09 ENCOUNTER — APPOINTMENT (OUTPATIENT)
Dept: ORTHOPEDIC SURGERY | Facility: CLINIC | Age: 72
End: 2022-08-09

## 2022-08-09 DIAGNOSIS — M17.11 UNILATERAL PRIMARY OSTEOARTHRITIS, RIGHT KNEE: ICD-10-CM

## 2022-08-09 DIAGNOSIS — Z96.641 PRESENCE OF RIGHT ARTIFICIAL HIP JOINT: ICD-10-CM

## 2022-08-09 DIAGNOSIS — M16.11 UNILATERAL PRIMARY OSTEOARTHRITIS, RIGHT HIP: ICD-10-CM

## 2022-08-09 PROCEDURE — 20611 DRAIN/INJ JOINT/BURSA W/US: CPT

## 2022-08-09 PROCEDURE — 99214 OFFICE O/P EST MOD 30 MIN: CPT | Mod: 25

## 2022-08-09 PROCEDURE — 73503 X-RAY EXAM HIP UNI 4/> VIEWS: CPT | Mod: RT

## 2022-08-09 NOTE — PROCEDURE
[Large Joint Injection] : Large joint injection [Right] : of the right [Knee] : knee [Pain] : pain [Inflammation] : inflammation [X-ray evidence of Osteoarthritis on this or prior visit] : x-ray evidence of Osteoarthritis on this or prior visit [Alcohol] : alcohol [Betadine] : betadine [Ethyl Chloride sprayed topically] : ethyl chloride sprayed topically [Sterile technique used] : sterile technique used [___ cc    1%] : Lidocaine ~Vcc of 1%  [___ cc    32 units 5mg] : Zilretta ~Vcc of 32 units 5 mg  [] : Patient tolerated procedure well [Call if redness, pain or fever occur] : call if redness, pain or fever occur [Apply ice for 15min out of every hour for the next 12-24 hours as tolerated] : apply ice for 15 minutes out of every hour for the next 12-24 hours as tolerated [Patient was advised to rest the joint(s) for ____ days] : patient was advised to rest the joint(s) for [unfilled] days [Previous OTC use and PT nontherapeutic] : patient has tried OTC's including aspirin, Ibuprofen, Aleve, etc or prescription NSAIDS, and/or exercises at home and/or physical therapy without satisfactory response [Risks, benefits, alternatives discussed / Verbal consent obtained] : the risks benefits, and alternatives have been discussed, and verbal consent was obtained [Prior failure or difficult injection] : prior failure or difficult injection [All ultrasound images have been permanently captured and stored accordingly in our picture archiving and communication system] : All ultrasound images have been permanently captured and stored accordingly in our picture archiving and communication system [Visualization of the needle and placement of injection was performed without complication] : visualization of the needle and placement of injection was performed without complication

## 2022-08-09 NOTE — ASSESSMENT
[FreeTextEntry1] : Previous doc:\par we discussed her options including mri cortisone and ha injections. discussed arthroscopy would likely not help her. could do therapy as well for her buckling on the right knee\par 2/22/21: Inj tolerated well.\par 3/1/21: Inj tolerated well.\par 3/30/21: with really good results on the right knee and poor results on left and continued large effusion recommend to get MRI of left knee to evaluate for meniscus tear.\par 4/13/21: MRI of the left knee showing medial meniscus root tear with some tricompartment OA. With less OA in the right knee but significantly more symptomatic then the right knee. Has minimal medial pain but significant posterior pain today, will like try one more injection. Had little relief from cortisone injection, will try Zilretta and PT before proceeding with surgical intervention.\par 7/30/21 -retrunof symptom s- likely more Meniscal than OA at this point but unsure - I will try asp inj again as she has some medical issues as well as helping her mom but if retruns likely suggest arthrhosocpy\par 11/19/21: Repeat zilretta left knee today tolerated well.\par 2/22/22: Progression to adv OA left knee - zilretta today tolerated well, asp 40cc. Discussed left TKA when she is ready. Too much damage for scope, has some ant knee pain as well.\par 5/17/22: Too soon for repeat zilretta.  Cortisone inj today tolerated well, will return next month after her vacation to discuss repeat zilretta vs TKA.\par 6/7/22 she has a lot happening with her mom so has to delay surgery but thinking in 4-5 months- she is still on eliquis so cannot take NSAIDs - we will try another zillretta inj today and plan for surgery in sept/oct -  \par \par 8/9/22: Mild/mod right hip OA; most likely aggravated by limping due to adv knee OA in bilateral knees. Discussed TKA, r/b/a, and preop/postop periods in detail. She is ready to proceed with L TKA. Will administer Zilretta right knee, tolerated well today.

## 2022-08-09 NOTE — DISCUSSION/SUMMARY
[de-identified] : The natural progression of Osteoarthritis was explained to the patient.  We discussed the possible treatment options from conservative to operative.  These included NSAIDS, Glucosamine and Chondrotin sulfate, and Physical Therapy as well different types of injections.  We also discussed that at some point they may progress to needed a TKA.  Information and pamphlets were given when appropriate.\par \par Patient Complains of pain in Knee with a level that often reaches greater than a 8/10. The Pain has been\par progressively worsening of his/her treatment coarse. The pain has interfered with their ADLs and worsens with\par weight bearing. On exam they often have episodes of swelling/effusion with limited ROM. Pain worsens with ROM\par passive and active and I can palpate crepitus.\par  X rays were reviewed with the patient and they show joint space narrowing, subchondral sclerosis,\par osteophyte formation, and subchondral cysts.\par  After a period of more than 12 weeks physical therapy or exercise program done with me or another\par treating physician they have continued pain. The patient has failed a trial of NSAID medication or pain relieves if\par they were unable to tolerate NSAID medications as well as a series of injection, steroid or Hyaluronic Acid. After a\par long discussion with the patient both the patient and I have decided we have exhausted all forms of less radical\par treatments and they would like to proceed with Total Knee Replacement\par \par We discussed my findings and the natural history of their condition.  We talked about the details of the proposed surgery and the recovery.  We discussed the material risks, possible benefits and alternatives to surgery.  The risks include but are not limited to infection, bleeding and possible need for blood transfusion, fracture, bowel blockage, bladder retention or infection, need for reoperation, stiffness and/or limited range of motion, possible damage to nerves and blood vessels, failure of fixation of components, risk of deep vein thromboses and pulmonary embolism, wound healing problems, dislocation, and possible leg length discrepancy.  Although incredibly rare, we also discussed the risks of a cardiac event, stroke and even death during, or following, the surgery.  We discussed the type of implants the patient will be receiving and the type of fixation that will be used, as well as whether a robot or computer navigation aide will be used.  The patient understands they will need medical clearance and will attend a preoperative joint education class.  We also discussed the type of anesthesia they will receive, and the risks associated with hospital or rehab length of stay, obesity, diabetes and smoking.\par

## 2022-08-09 NOTE — HISTORY OF PRESENT ILLNESS
[Intermittent] : intermittent [Nothing helps with pain getting better] : Nothing helps with pain getting better [Walking] : walking [Stairs] : stairs [de-identified] : 8/9/22: Here for follow up of bilateral knees. She is also here with a new onset of right hip/groin pain for the past 2 weeks with no injury. 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 Over the last few months pain has been returning. Pain with start up, and down stairs.\par The right knee several month history of episodic laterally based right knee pain with prolonged standing.\par PMH: Afib (ellaquis) can not take nsaids\par htn, depression\par retired\par 2/22/21: Euflexxa #2 b/l knees.\par 3/1/21: Euflexxa #3 b/l knees.\par 3/30/21: 4 weeks s/p bilateral euflexxa injections. Right knee significantly improved but left knee continues to have significant pain.\par 4/13/21: Followed today for MRI of the left knee- with continued pain and symptoms.\par 7/30 - return of swelling and pain in the left knee no sig pain in rt knee\par 11/19/21: Left knee pain returned 2 weeks ago, no injury. Had good relief from zilretta until now.\par 5/17/22: Zilretta left knee helped a lot but pain returned 2 weeks ago.\par 6/7/22 more than 3 months from Zillretta - depo did no sig help -  overall knee pain is worse and feels unsafe and leg giving out on her   [] : no [FreeTextEntry1] : left knee  [FreeTextEntry5] : pt reports pain is the same since last visit. pt states right knee and right hip is bothering her now. right knee is buckling.

## 2022-08-09 NOTE — IMAGING
[Right] : right hip with pelvis [Moderate arthritis (Tonnis Grade 2)] : Moderate arthritis (Tonnis Grade 2) [de-identified] : left knee: \par pain med and lateral joint line \par Crepitus\par NVI\par Strenth 5/5\par Pulses +2 DP/PT\par Decreased ROM -\par moderate effusion \par \par right knee: \par pain med and lateral joint line \par Crepitus\par NVI\par Strenth 5/5\par Pulses +2 DP/PT\par Decreased ROM -\par moderate effusion \par \par right hip \par +groin tenderness\par pain with flexion\par limited internal rotation \par pain in groin with internal rotation

## 2022-08-29 ENCOUNTER — APPOINTMENT (OUTPATIENT)
Dept: PULMONOLOGY | Facility: CLINIC | Age: 72
End: 2022-08-29

## 2022-08-29 DIAGNOSIS — S27.309A UNSPECIFIED INJURY OF LUNG, UNSPECIFIED, INITIAL ENCOUNTER: ICD-10-CM

## 2022-08-29 DIAGNOSIS — G47.33 OBSTRUCTIVE SLEEP APNEA (ADULT) (PEDIATRIC): ICD-10-CM

## 2022-08-29 DIAGNOSIS — R06.00 DYSPNEA, UNSPECIFIED: ICD-10-CM

## 2022-08-29 DIAGNOSIS — U07.1 COVID-19: ICD-10-CM

## 2022-08-29 DIAGNOSIS — J45.909 UNSPECIFIED ASTHMA, UNCOMPLICATED: ICD-10-CM

## 2022-08-29 DIAGNOSIS — I27.20 PULMONARY HYPERTENSION, UNSPECIFIED: ICD-10-CM

## 2022-08-29 LAB — POCT - HEMOGLOBIN (HGB), QUANTITATIVE, TRANSCUTANEOUS: 14

## 2022-08-29 PROCEDURE — 99214 OFFICE O/P EST MOD 30 MIN: CPT | Mod: CS,25

## 2022-08-29 PROCEDURE — 94727 GAS DIL/WSHOT DETER LNG VOL: CPT

## 2022-08-29 PROCEDURE — 94729 DIFFUSING CAPACITY: CPT

## 2022-08-29 PROCEDURE — ZZZZZ: CPT

## 2022-08-29 PROCEDURE — 94010 BREATHING CAPACITY TEST: CPT

## 2022-08-29 PROCEDURE — 88738 HGB QUANT TRANSCUTANEOUS: CPT

## 2022-08-29 NOTE — DISCUSSION/SUMMARY
[FreeTextEntry1] : PreOP TKR\par COVID 19 infection 5/31/22\par Prior hx COVID infection\par completed Paxlovid\par no residual sxs\par Noted radiographic interval and provement of the right hemidiaphragm which appears normalized\par Normalization of prior noted diffusion impairment\par \par Patient demonstrates significant interval improvement of pulmonary physiology including flow rates significant improvement at diffusion and total lung capacity\par Significant interval improvement of 6-minute walk test with resolution of exercise-induced hypoxemia\par Anatomically chest x-ray also demonstrates improvement of overall lung volume at right\par 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 cardiac ablation diaphgram paralysis\par Chest tightness with exertional dyspnea Not Active\par Cardiac arrhythmias atrial fibrillation post EPS cardiac ablation November 11 2021\par Management for pericarditis\par New findings elevated right hemidiaphragm-rule out traumatic injury rule out paralysis-this can account for the sensation of chest tightness and contribute to shortness of breath\par History asthma\par Deconditioning with overweight\par Clinical exam does not demonstrate audible wheezing\par Post COVID-19 infection without  residual sxs\par \par Recommendations\par note on BB\par CT chest completed\par Sniff test reviewed\par inhaled corticosteroids- Change to symbicort OFF\par Patient is pulmonary cleared for scheduled ORTHO TKR with contraindication\par \par \par

## 2022-08-29 NOTE — PROCEDURE
[FreeTextEntry1] : PFT August 29, 2022\par Flow rates normal\par Lung volumes normal 1 TLC 94% predicted\par Diffusion 73% predicted hemoglobin 14.0\par Noted interval improvement of prior mild gas exchange impairment\par Hemoglobin 14\par \par PFT 6/29/22\par flow rate snl flow\par   %\par  DLCO 69 % just below nl range\par HGB 13.4\par \par Chest x-ray PA lateral 6/29/22\par Cardiac size normal\par Clear lung fields no evidence for parenchymal infiltrates pleural effusions or dominant pulmonary nodules\par Diaphragm appears grossly normal in size without any significant volume loss\par In comparison to a prior chest x-ray of December 8, 2021 there is significant improvement of the prior noted elevated right hemidiaphragm\par \par PFT 3/25/22\par Flow rates nl\par  Lung Volumes nl\par  DLCO 63 % with moderate loss fx  alveolar  capillary nits HGB 13.6\par Interval improvement of overall pulmonary physiology\par NIOX 15  ppb WNL\par \par Chest x-ray PA lateral March 25, 2022\par Cardiac size is normal\par Lung fields are clear\par No parenchymal infiltrates pleural effusions or dominant pulmonary nodules\par Mild elevation right hemidiaphragm\par Compared to chest x-ray dating back to 12/8/2021 patient improved overall prior noted volume loss\par Clinical  correlation\par \par Pulmonary 6-minute walk exercise study March 25, 2022\par Baseline room air O2 saturation 97%\par Srini desaturation to 93%\par Impression normal study\par Compared to prior study of December 22, 2021 this is significant interval improvement as patient previously demonstrated needed desaturation at minute #1-80 7%\par \par \par Chest CT 12/16/2021\par Elevated right hemidiaphragm new compared to August 31, 2021\par Mild passive atelectasis right lower and middle lobes\par No mediastinal adenopathy\par No tumors\par Mild coronary calcified plaque\par \par PFT 12/22/21\par Mild OAD\par No BD at FEV1\par TLC 72 % \par Resistance and sp conductance normal\par DLCO 46 % pred with severe gas exchange impairment\par IMP\par 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\par with severe gas exchange impairment\par HGB13.4\par \par NIOX 9 ppb WNL 12/22/21\par \par Pulmonary 6  minute walk stress test  12/22/21\par  Pos RA study srini deat 87 % at minute # 1 \par on O2 2 liters 88-89 %\par  Recovery 99 %\par \par Chest x-ray PA lateral December 8, 2021\par Normal cardiac size\par Mild elevation right hemidiaphragm\par Lung fields are otherwise clear without parenchymal infiltrates pleural effusions or dominant pulmonary nodules\par Impression elevation right hemidiaphragm\par \par Chest x-ray of August 31, 2021 single view\par Normal heart size clear lungs no pleural effusions no pneumothorax\par No evidence for elevation of the right heminoted on the July 28, 2021 chest x-ray at [facility] low lung volumes with poor inspiratory effort with suggestion of patchy airspace disease likely atelectatic\par

## 2022-08-29 NOTE — PHYSICAL EXAM
[No Acute Distress] : no acute distress [Normal Oropharynx] : normal oropharynx [Normal Appearance] : normal appearance [Supple] : supple [No Neck Mass] : no neck mass [No JVD] : no jvd [Normal Rate/Rhythm] : normal rate/rhythm [Normal S1, S2] : normal s1, s2 [No Murmurs] : no murmurs [No Resp Distress] : no resp distress [No Acc Muscle Use] : no acc muscle use [Normal Palpation] : normal palpation [Normal Rhythm and Effort] : normal rhythm and effort [Clear to Auscultation Bilaterally] : clear to auscultation bilaterally [Normal to Percussion] : normal to percussion [No Abnormalities] : no abnormalities [Benign] : benign [Not Tender] : not tender [Soft] : soft [No HSM] : no hsm [Normal Bowel Sounds] : normal bowel sounds [Normal Gait] : normal gait [No Clubbing] : no clubbing [No Cyanosis] : no cyanosis [No Edema] : no edema [FROM] : FROM [Normal Color/ Pigmentation] : normal color/ pigmentation [No Focal Deficits] : no focal deficits [Oriented x3] : oriented x3 [Normal Affect] : normal affect [Low Lying Soft Palate] : low lying soft palate [III] : Mallampati Class: III

## 2022-08-29 NOTE — HISTORY OF PRESENT ILLNESS
[Former] : former [TextBox_4] : PreOP Left TKR\par \par Post second COVID infection May 2022\par  completed PAxlovid no residual sxs\par  COVID Vaccine x2 with booster x 2\par \par Unilateral right hemidiaphragm paralysis Resolved based on CXR normalization of right hemidiaphragm \par post bronchitis no active sxs\par feels neck choking\par pos hx PRIYA not on tx\par SOB with exercise Resolved\par but overall  better\par \par  pulmonary evaluation\par Referral Dr. Stafford\par Chief complaint shortness of breath dyspnea on exertion\par Chest tightness patient has a prior longstanding history of asthma without recent exacerbations or flareups\par Remote history of bronchitis\par She is status post a cardiac ablation November 11, 2021 will atrial fibrillation possibly complicated by pericarditis and currently on colchicine\par She states she had emergency room visits where she was treated with IV metoprolol to slow her heart rate\par At present she is in normal sinus rhythm\par She states she was diagnosed with COVID-19 infection August 27, 2021 which was very mild and did not require treatment\par She did not require hospitalization for the Covid infection, did not receive monoclonal antibody or other treatment protocol\par She is a former smoker with a less than 30-year pack history\par No history of chemical toxic inhalational exposures\par Noted that she is on anticoagulation Eliquis for the cardiac arrhythmias\par She remains on oral beta-blocker\par Other medications reviewed\par \par ENT\par ENT evaluation\par Chronic cough\par Endoscopic findings of laryngeal pharyngeal reflux\par Patient was started on famotidine 40 mg at bedtime with addition nasal astelin Flonase Prilosec 40 mg\par Reflux diet lifestyle modifications\par If symptoms do not improve noted that they will consider CT of the neck\par \par Cardiac\par Echocardiogram 1/31/2022\par Normal LV\par Normal RV\par Mild to moderate mitral regurgitation\par Moderate pulmonary hypertension with estimated pulmonary artery systolic pressure 51\par Prior pulmonary hypertension estimate 62 demonstrating some clinical improvement\par Address further work-up for pulmonary hypertension [TextBox_11] : 1 1/2 [YearQuit] : 1995 [TextBox_29] : 14 year tobacco use

## 2022-09-07 ENCOUNTER — NON-APPOINTMENT (OUTPATIENT)
Age: 72
End: 2022-09-07

## 2022-09-07 ENCOUNTER — APPOINTMENT (OUTPATIENT)
Dept: ELECTROPHYSIOLOGY | Facility: CLINIC | Age: 72
End: 2022-09-07

## 2022-09-07 VITALS
DIASTOLIC BLOOD PRESSURE: 70 MMHG | BODY MASS INDEX: 30.12 KG/M2 | WEIGHT: 170 LBS | TEMPERATURE: 97.1 F | OXYGEN SATURATION: 98 % | HEIGHT: 63 IN | SYSTOLIC BLOOD PRESSURE: 128 MMHG | HEART RATE: 62 BPM

## 2022-09-07 DIAGNOSIS — I48.91 UNSPECIFIED ATRIAL FIBRILLATION: ICD-10-CM

## 2022-09-07 DIAGNOSIS — Z86.79 OTHER SPECIFIED POSTPROCEDURAL STATES: ICD-10-CM

## 2022-09-07 DIAGNOSIS — Z98.890 OTHER SPECIFIED POSTPROCEDURAL STATES: ICD-10-CM

## 2022-09-07 PROCEDURE — 99213 OFFICE O/P EST LOW 20 MIN: CPT

## 2022-09-07 PROCEDURE — 93000 ELECTROCARDIOGRAM COMPLETE: CPT

## 2022-09-07 RX ORDER — BUDESONIDE AND FORMOTEROL FUMARATE DIHYDRATE 160; 4.5 UG/1; UG/1
160-4.5 AEROSOL RESPIRATORY (INHALATION)
Qty: 2 | Refills: 3 | Status: DISCONTINUED | COMMUNITY
Start: 2022-02-02 | End: 2022-09-07

## 2022-09-07 NOTE — PHYSICAL EXAM
[No Acute Distress] : no acute distress [Normal Conjunctiva] : normal conjunctiva [Normal Venous Pressure] : normal venous pressure [Normal S1, S2] : normal S1, S2 [No Murmur] : no murmur [No Rub] : no rub [Clear Lung Fields] : clear lung fields [Non Tender] : non-tender [Normal Gait] : normal gait [No Edema] : no edema [No Rash] : no rash [No Focal Deficits] : no focal deficits [Normal] : alert and oriented, normal memory [Alert and Oriented] : alert and oriented

## 2022-09-08 ENCOUNTER — OUTPATIENT (OUTPATIENT)
Dept: OUTPATIENT SERVICES | Facility: HOSPITAL | Age: 72
LOS: 1 days | Discharge: HOME | End: 2022-09-08

## 2022-09-08 VITALS
WEIGHT: 172.4 LBS | DIASTOLIC BLOOD PRESSURE: 80 MMHG | RESPIRATION RATE: 17 BRPM | TEMPERATURE: 97 F | SYSTOLIC BLOOD PRESSURE: 131 MMHG | OXYGEN SATURATION: 99 % | HEIGHT: 63 IN | HEART RATE: 69 BPM

## 2022-09-08 DIAGNOSIS — I47.1 SUPRAVENTRICULAR TACHYCARDIA: ICD-10-CM

## 2022-09-08 DIAGNOSIS — M17.12 UNILATERAL PRIMARY OSTEOARTHRITIS, LEFT KNEE: ICD-10-CM

## 2022-09-08 DIAGNOSIS — Z98.890 OTHER SPECIFIED POSTPROCEDURAL STATES: Chronic | ICD-10-CM

## 2022-09-08 DIAGNOSIS — G47.30 SLEEP APNEA, UNSPECIFIED: ICD-10-CM

## 2022-09-08 DIAGNOSIS — Z01.818 ENCOUNTER FOR OTHER PREPROCEDURAL EXAMINATION: ICD-10-CM

## 2022-09-08 DIAGNOSIS — J45.909 UNSPECIFIED ASTHMA, UNCOMPLICATED: ICD-10-CM

## 2022-09-08 DIAGNOSIS — E78.5 HYPERLIPIDEMIA, UNSPECIFIED: ICD-10-CM

## 2022-09-08 DIAGNOSIS — I10 ESSENTIAL (PRIMARY) HYPERTENSION: ICD-10-CM

## 2022-09-08 DIAGNOSIS — I48.91 UNSPECIFIED ATRIAL FIBRILLATION: ICD-10-CM

## 2022-09-08 DIAGNOSIS — Z90.710 ACQUIRED ABSENCE OF BOTH CERVIX AND UTERUS: Chronic | ICD-10-CM

## 2022-09-08 LAB
A1C WITH ESTIMATED AVERAGE GLUCOSE RESULT: 5.6 % — SIGNIFICANT CHANGE UP (ref 4–5.6)
ALBUMIN SERPL ELPH-MCNC: 3.2 G/DL — LOW (ref 3.3–5)
ALP SERPL-CCNC: 119 U/L — SIGNIFICANT CHANGE UP (ref 40–120)
ALT FLD-CCNC: 37 U/L — SIGNIFICANT CHANGE UP (ref 12–78)
ANION GAP SERPL CALC-SCNC: 3 MMOL/L — LOW (ref 5–17)
APTT BLD: 40.2 SEC — HIGH (ref 27.5–35.5)
AST SERPL-CCNC: 21 U/L — SIGNIFICANT CHANGE UP (ref 15–37)
BASOPHILS # BLD AUTO: 0.04 K/UL — SIGNIFICANT CHANGE UP (ref 0–0.2)
BASOPHILS NFR BLD AUTO: 0.6 % — SIGNIFICANT CHANGE UP (ref 0–2)
BILIRUB SERPL-MCNC: 0.4 MG/DL — SIGNIFICANT CHANGE UP (ref 0.2–1.2)
BUN SERPL-MCNC: 30 MG/DL — HIGH (ref 7–23)
CALCIUM SERPL-MCNC: 9.2 MG/DL — SIGNIFICANT CHANGE UP (ref 8.5–10.1)
CHLORIDE SERPL-SCNC: 106 MMOL/L — SIGNIFICANT CHANGE UP (ref 96–108)
CO2 SERPL-SCNC: 32 MMOL/L — HIGH (ref 22–31)
CREAT SERPL-MCNC: 0.79 MG/DL — SIGNIFICANT CHANGE UP (ref 0.5–1.3)
EGFR: 80 ML/MIN/1.73M2 — SIGNIFICANT CHANGE UP
EOSINOPHIL # BLD AUTO: 0.14 K/UL — SIGNIFICANT CHANGE UP (ref 0–0.5)
EOSINOPHIL NFR BLD AUTO: 2.3 % — SIGNIFICANT CHANGE UP (ref 0–6)
ESTIMATED AVERAGE GLUCOSE: 114 MG/DL — SIGNIFICANT CHANGE UP (ref 68–114)
GLUCOSE SERPL-MCNC: 73 MG/DL — SIGNIFICANT CHANGE UP (ref 70–99)
HCT VFR BLD CALC: 41.5 % — SIGNIFICANT CHANGE UP (ref 34.5–45)
HGB BLD-MCNC: 12.8 G/DL — SIGNIFICANT CHANGE UP (ref 11.5–15.5)
IMM GRANULOCYTES NFR BLD AUTO: 0.3 % — SIGNIFICANT CHANGE UP (ref 0–1.5)
INR BLD: 1.29 RATIO — HIGH (ref 0.88–1.16)
LYMPHOCYTES # BLD AUTO: 2.11 K/UL — SIGNIFICANT CHANGE UP (ref 1–3.3)
LYMPHOCYTES # BLD AUTO: 34 % — SIGNIFICANT CHANGE UP (ref 13–44)
MCHC RBC-ENTMCNC: 28.6 PG — SIGNIFICANT CHANGE UP (ref 27–34)
MCHC RBC-ENTMCNC: 30.8 G/DL — LOW (ref 32–36)
MCV RBC AUTO: 92.8 FL — SIGNIFICANT CHANGE UP (ref 80–100)
MONOCYTES # BLD AUTO: 0.42 K/UL — SIGNIFICANT CHANGE UP (ref 0–0.9)
MONOCYTES NFR BLD AUTO: 6.8 % — SIGNIFICANT CHANGE UP (ref 2–14)
MRSA PCR RESULT.: SIGNIFICANT CHANGE UP
NEUTROPHILS # BLD AUTO: 3.48 K/UL — SIGNIFICANT CHANGE UP (ref 1.8–7.4)
NEUTROPHILS NFR BLD AUTO: 56 % — SIGNIFICANT CHANGE UP (ref 43–77)
NRBC # BLD: 0 /100 WBCS — SIGNIFICANT CHANGE UP (ref 0–0)
PLATELET # BLD AUTO: 219 K/UL — SIGNIFICANT CHANGE UP (ref 150–400)
POTASSIUM SERPL-MCNC: 3.8 MMOL/L — SIGNIFICANT CHANGE UP (ref 3.5–5.3)
POTASSIUM SERPL-SCNC: 3.8 MMOL/L — SIGNIFICANT CHANGE UP (ref 3.5–5.3)
PROT SERPL-MCNC: 7.3 GM/DL — SIGNIFICANT CHANGE UP (ref 6–8.3)
PROTHROM AB SERPL-ACNC: 15.4 SEC — HIGH (ref 10.5–13.4)
RBC # BLD: 4.47 M/UL — SIGNIFICANT CHANGE UP (ref 3.8–5.2)
RBC # FLD: 13.7 % — SIGNIFICANT CHANGE UP (ref 10.3–14.5)
S AUREUS DNA NOSE QL NAA+PROBE: SIGNIFICANT CHANGE UP
SODIUM SERPL-SCNC: 141 MMOL/L — SIGNIFICANT CHANGE UP (ref 135–145)
VIT D25+D1,25 OH+D1,25 PNL SERPL-MCNC: 27.5 PG/ML — SIGNIFICANT CHANGE UP (ref 19.9–79.3)
WBC # BLD: 6.21 K/UL — SIGNIFICANT CHANGE UP (ref 3.8–10.5)
WBC # FLD AUTO: 6.21 K/UL — SIGNIFICANT CHANGE UP (ref 3.8–10.5)

## 2022-09-08 NOTE — H&P PST ADULT - PROBLEM SELECTOR PROBLEM 8
URGENT CARE NOTE    Chief Complaint   Patient presents with   • Eye Problem     yellow discharge bilateral eyes started this morning.   • Nasal Congestion     nasal discharge x 3 days       HPI: Jasper Perry is a 4 year old female who comes in today complaining of yellow discharge from both eyes that started this morning. Mother notes she has had some nasal drainage for the past 3 days. Patient did recently finish a course of Augmentin for sinusitis. She did actually have 2 rounds of antibiotics for sinusitis in February. Mother states that symptoms did seem to fully resolve before returning 3 days ago. Patient has not had any fever she has been eating and drinking as normal. Has been acting as normal. Has been urinating well in fact mother notes she has been starting potty training and did well today with this. No vomiting. No rash. Patient has not been complaining of any pain.    Current Outpatient Prescriptions   Medication Sig Dispense Refill   • polyethylene glycol (MIRALAX) powder Take 8 g by mouth daily. 255 g 6   • trimethoprim-polymyxin b (POLYTRIM) ophthalmic solution Place 1 drop into both eyes every 4 hours. For 7-10 days 10 mL 0   • Spacer/Aero-Holding Chambers (AEROCHAMBER WITH MASK, MEDIUM) Use the spacer whenever the inhaler is used. DO NOT SUBSTITUTE MICROCHAMBER 1 each 0   • albuterol (PROAIR HFA) 108 (90 Base) MCG/ACT inhaler Inhale 3-4 puffs into the lungs every 4 hours as needed for Shortness of Breath or Wheezing. 1 Inhaler 0   • glycerin, pediatric, (GNP GLYCERIN, INFANT,) 1.2 g suppository Place 1 suppository rectally 2 times daily as needed for Constipation. 12 each 0     No current facility-administered medications for this visit.      ALLERGIES:  No Known Allergies  Past Medical History:   Diagnosis Date   • Premature infant of 32 weeks gestation      History reviewed. No pertinent family history.  Social History     Social History   •  Marital status: Single     Spouse name: N/A   • Number of children: N/A   • Years of education: N/A     Occupational History   • Not on file.     Social History Main Topics   • Smoking status: Never Smoker   • Smokeless tobacco: Never Used   • Alcohol use No   • Drug use: No   • Sexual activity: Not on file     Other Topics Concern   • Not on file     Social History Narrative   • No narrative on file     History   Smoking Status   • Never Smoker   Smokeless Tobacco   • Never Used       ROS:   Pertinent positives as noted in HPI.    PHYSICAL EXAMINATION:  Visit Vitals  Pulse 110   Temp 99.9 °F (37.7 °C) (Temporal Artery)   Resp 26   Wt (!) 10.3 kg   SpO2 100%       Constitutional:  Well developed, well nourished, no acute distress, non-toxic appearance   Eyes:  PERRL, conjunctiva slightly injected bilaterally. Thick yellow gree drainage from both eyes. No ophthalmoplegia. No surrounding cellulitic changes  HENT:  Atraumatic, normocephalic, external ears normal, external nose is normal, oropharynx moist, no pharyngeal exudates.  No tonsillar hypertrophy or exudate. Uvula is midline, no trismus. Nose septum midline, thick clear to yellow nasal drainage. Bilateral TMs pearly grey with intact landmark and light reflex. Canals are clear.   Neck- normal range of motion, no tenderness, supple   Respiratory:  No respiratory distress, normal breath sounds, no rales, no wheezing   Cardiovascular:  Normal rate, normal rhythm, no murmurs, no gallops, no rubs   GI:  Soft, skin overlying normal, nondistended, normal bowel sounds, nontender  Integument:  Well  hydrated, warm and dry, no skin lesions or rash noted  Lymphatic:  shotty cervical lymphadenopathy noted   Neurologic:  Alert & appropriate       ASSESSMENT:  1. Bacterial conjunctivitis           PLAN:  Orders Placed This Encounter   • trimethoprim-polymyxin b (POLYTRIM) ophthalmic solution     Exam is reassuring. She does have bacterial conjunctivitis bilaterally. We did  discuss that given symptoms did seem to fully resolve with the Augmentin at this time we'll not initiate further oral antibiotics. We did discuss signs and symptoms that require antibiotic use for her copious nasal drainage. Will return as needed. Suction nose often.Use eye drops as directed. Wash hands often. After you've been using the eyedrops for 24-48 hours wash  pillow cases and sheet to decrease the risk of becoming reinfected. Contagious for 24 hours after starting antibiotics. If patient develops any worsening symptoms or change in vision, worsening pain or any other concerning symptoms, should be re-seen at that time. Otherwise follow-up as needed.  Patient voices understanding and agreement with treatment plan. No questions at this time all questions were answered during the course of visit. Follow-up primary care as needed.  The patient understands that this is a provisional diagnosis and that the findings from today are a \"picture in time\" of their disease process. If the patient has questions at any time about their diagnosis, disease process, progression, or lack of therapeutic response, they are encouraged to call their primary care provider or the emergency department for further direction. New or changing symptoms often require prompt followup and re-evaluation.    Return for PRN for lasting or worsening symptoms.   Preoperative examination

## 2022-09-08 NOTE — PHYSICAL THERAPY INITIAL EVALUATION ADULT - ADDITIONAL COMMENTS
As per pt : There are 5 steps, c far elliott rails,  at the entry of the coop and steps to no negotiate at home. Pt has a tub/shower combo c fixed shower head, no grab bar, and regular toilet seat  in BR. 3-1 commode will fit in BR. Average pain level at rest is 4/10. Pain increases to 10/10 c  prolonged standing. Pt apply ice for pain management and does not take any meds. Pt has no experience of adverse effects with pain medication. Pt is not on out-pt physical therapy at present. There is no h/o fall but knee ken daily. Pt wears glasses for reading and distance and no need for hearing aid. Pt is R handed and drives.

## 2022-09-08 NOTE — PHYSICAL THERAPY INITIAL EVALUATION ADULT - MARITAL STATUS
However,  is unable to provide post-op support. Pt has no one to assist for post-op care upon discharge./

## 2022-09-08 NOTE — PHYSICAL THERAPY INITIAL EVALUATION ADULT - GENERAL OBSERVATIONS, REHAB EVAL
Patient was seen seated  in chair in PT/OT preoperative assessment room with no apparent distress. Height:   5'3"    ; weight :  172   lbs.

## 2022-09-08 NOTE — H&P PST ADULT - MUSCULOSKELETAL COMMENTS
Continue Cogentin, Klonopin. Haldol on hold. Continue Cogentin, Klonopin. Haldol on hold. Psych follow up noted. hydration Continue Cogentin, Klonopin. Haldol on hold. left knee

## 2022-09-08 NOTE — OCCUPATIONAL THERAPY INITIAL EVALUATION ADULT - ADDITIONAL COMMENTS
in 48 hours./shower only
Pt lives with spouse (Who can not assist post op) in a co-op with 5 steps to enter with bilateral handrails that are far apart. Once inside, the pt bedroom and bathroom is on that floor when entering. The pt bathroom has a tub/shower combination, fixed shower head, standard toilet seat and no grab bars. The pt communicated that the commode may not fit over the toilet at home. OT educated pt on raised toilet seat with arms. Pt reports that she will purchase device. The pt ambulates with no device and owns a rolling walker. The pt daily pain is a 4/10 at rest and a 10/10 with movement. The pt manages the pain with rest and ice. Pt reports that she does not take pain medications. The pt has no recent outpatient PT, no recent falls and has buckling of the knees all the time. The pt wears glasses all the time, R handed, drives and has no hearing impairments.

## 2022-09-08 NOTE — H&P PST ADULT - PROBLEM SELECTOR PLAN 8
Assessment and Plan: labs - cbc,pt/ptt,bmp,t&s,nose cx,ekg  M/C required , pulmonay , cardio  preop 3 day hibiclens instruction reviewed and given .instructed on if  nose cx positive use mupuricin 5 days and checklist given  take routine meds DOS with sips of water. avoid NSAID and OTC supplements. verbalized understanding  information on proper nutrition , increase protein and better food choices provided in packet  ensure clear  patient instructed on having covid19 swab 3-5 days prior to surgery

## 2022-09-08 NOTE — OCCUPATIONAL THERAPY INITIAL EVALUATION ADULT - PERTINENT HX OF CURRENT PROBLEM, REHAB EVAL
L knee OA which impacts pts ability to perform functional tasks/transfers and mobility. Pt is scheduled for L TKR on 09/21/22.

## 2022-09-08 NOTE — OCCUPATIONAL THERAPY INITIAL EVALUATION ADULT - NSOTDMEREC_GEN_A_CORE
Recommending 3/1 commode for safe transfers and ADL management. Pt reports that she will purchase a raised toilet seat with arms.

## 2022-09-08 NOTE — H&P PST ADULT - ATTENDING COMMENTS
08-Oct-2018 03:57
Pt has advanced OA on X-ray.  They have failed all forms of conservative treatment including PT and Meds and Injections as necessary.  They are her for Total Joint Replacement.

## 2022-09-08 NOTE — PHYSICAL THERAPY INITIAL EVALUATION ADULT - GAIT DEVIATIONS NOTED, PT EVAL
excessive L knee flexion and body sway./decreased yassine/decreased step length/decreased stride length/decreased weight-shifting ability

## 2022-09-08 NOTE — PHYSICAL THERAPY INITIAL EVALUATION ADULT - NSPTDMEREC_GEN_A_CORE
Pt owns a rolling walker and it's in good working condition. Pt will purchase a raised toilet seat c arms./straight cane

## 2022-09-08 NOTE — PHYSICAL THERAPY INITIAL EVALUATION ADULT - PERTINENT HX OF CURRENT PROBLEM, REHAB EVAL
L knee OA c chronic pain and  limiting functional mobility. Pt is scheduled for L knee elective total joint replacement on  9/21/22 by  Dr. Blum.

## 2022-09-08 NOTE — H&P PST ADULT - HISTORY OF PRESENT ILLNESS
71 yo female , pmh-WpW, PSVT once, htn, depression , asthma, sleep apnea not using cpap- c/o left knee pain secondary  to osteoarthritis  - scheduled for left  knee arthroplasty  had covid19 in may 2022  symptoms  resolved   covid vaccine completed

## 2022-09-08 NOTE — H&P PST ADULT - NSICDXPASTMEDICALHX_GEN_ALL_CORE_FT
PAST MEDICAL HISTORY:  Afib     Asthma     HLD (hyperlipidemia)     HTN (hypertension)     Skin cancer     Sleep apnea     SVT (supraventricular tachycardia)     WPW syndrome

## 2022-09-08 NOTE — H&P PST ADULT - ASSESSMENT
left knee osteoarthritis  CAPRINI SCORE    AGE RELATED RISK FACTORS                                                       MOBILITY RELATED FACTORS  [ ] Age 41-60 years                                            (1 Point)                  [ ] Bed rest                                                        (1 Point)  [x] Age: 61-74 years                                           (2 Points)                [ ] Plaster cast                                                   (2 Points)  [ ] Age= 75 years                                              (3 Points)                 [ ] Bed bound for more than 72 hours                   (2 Points)    DISEASE RELATED RISK FACTORS                                               GENDER SPECIFIC FACTORS  [ ] Edema in the lower extremities                       (1 Point)                  [ ] Pregnancy                                                     (1 Point)  [ ] Varicose veins                                               (1 Point)                  [ ] Post-partum < 6 weeks                                   (1 Point)             [x ] BMI > 25 Kg/m2                                            (1 Point)                  [ ] Hormonal therapy  or oral contraception            (1 Point)                 [ ] Sepsis (in the previous month)                        (1 Point)                  [ ] History of pregnancy complications  [ ] Pneumonia or serious lung disease                                               [ ] Unexplained or recurrent                       (1 Point)           (in the previous month)                               (1 Point)  [ ] Abnormal pulmonary function test                     (1 Point)                 SURGERY RELATED RISK FACTORS  [ ] Acute myocardial infarction                              (1 Point)                 [ ]  Section                                            (1 Point)  [ ] Congestive heart failure (in the previous month)  (1 Point)                 [ ] Minor surgery                                                 (1 Point)   [ ] Inflammatory bowel disease                             (1 Point)                 [ ] Arthroscopic surgery                                        (2 Points)  [ ] Central venous access                                    (2 Points)                [ ] General surgery lasting more than 45 minutes   (2 Points)       [ ] Stroke (in the previous month)                          (5 Points)               x[ ] Elective arthroplasty                                        (5 Points)                                                                                                                                               HEMATOLOGY RELATED FACTORS                                                 TRAUMA RELATED RISK FACTORS  [ ] Prior episodes of VTE                                     (3 Points)                 [ ] Fracture of the hip, pelvis, or leg                       (5 Points)  [ ] Positive family history for VTE                         (3 Points)                 [ ] Acute spinal cord injury (in the previous month)  (5 Points)  [ ] Prothrombin 59706 A                                      (3 Points)                 [ ] Paralysis  (less than 1 month)                          (5 Points)  [ ] Factor V Leiden                                             (3 Points)                 [ ] Multiple Trauma within 1 month                         (5 Points)  [ ] Lupus anticoagulants                                     (3 Points)                                                           [ ] Anticardiolipin antibodies                                (3 Points)                                                       [ ] High homocysteine in the blood                      (3 Points)                                             [ ] Other congenital or acquired thrombophilia       (3 Points)                                                [ ] Heparin induced thrombocytopenia                  (3 Points)                                          Total Score [    8     ]  Caprini Score 0-2: Low risk, No VTE Prophylaxis required for most patient's, encourage ambulation  Caprini Score 3-6: At Risk, Pharmacologic VTE prophylaxis is indicated for most patients ( in the absence of a contraindication)  Caprini Score Greater than or = 7: High Risk , pharmacologic VTE is indicated for most patients ( in the absence of a contraindication)    Caprini score indicates that the patient is high risk for VTE event ( score 6 or greater). Surgical patient's in this group will benefit from both pharmacologic prophylaxis and intermittent compression devices . Surgical team will determine the balance between VTE  risk and bleeding risk and other clinical considerations

## 2022-09-15 PROBLEM — Z98.890 S/P ABLATION OF ATRIAL FIBRILLATION: Status: ACTIVE | Noted: 2021-12-25

## 2022-09-15 PROBLEM — I48.91 AFIB: Status: ACTIVE | Noted: 2020-02-26

## 2022-09-20 ENCOUNTER — FORM ENCOUNTER (OUTPATIENT)
Age: 72
End: 2022-09-20

## 2022-09-21 ENCOUNTER — TRANSCRIPTION ENCOUNTER (OUTPATIENT)
Age: 72
End: 2022-09-21

## 2022-09-21 ENCOUNTER — APPOINTMENT (OUTPATIENT)
Dept: ORTHOPEDIC SURGERY | Facility: HOSPITAL | Age: 72
End: 2022-09-21

## 2022-09-21 ENCOUNTER — OUTPATIENT (OUTPATIENT)
Dept: OUTPATIENT SERVICES | Facility: HOSPITAL | Age: 72
LOS: 1 days | Discharge: NURSING, REHAB OR LONG-TERM CARE | End: 2022-09-21

## 2022-09-21 ENCOUNTER — NON-APPOINTMENT (OUTPATIENT)
Age: 72
End: 2022-09-21

## 2022-09-21 DIAGNOSIS — Z90.710 ACQUIRED ABSENCE OF BOTH CERVIX AND UTERUS: Chronic | ICD-10-CM

## 2022-09-21 DIAGNOSIS — Z98.890 OTHER SPECIFIED POSTPROCEDURAL STATES: Chronic | ICD-10-CM

## 2022-09-22 ENCOUNTER — TRANSCRIPTION ENCOUNTER (OUTPATIENT)
Age: 72
End: 2022-09-22

## 2022-09-30 ENCOUNTER — APPOINTMENT (OUTPATIENT)
Dept: PULMONOLOGY | Facility: CLINIC | Age: 72
End: 2022-09-30

## 2022-10-01 ENCOUNTER — OUTPATIENT (OUTPATIENT)
Dept: OUTPATIENT SERVICES | Facility: HOSPITAL | Age: 72
LOS: 1 days | Discharge: HOME | End: 2022-10-01

## 2022-10-01 DIAGNOSIS — I82.403 ACUTE EMBOLISM AND THROMBOSIS OF UNSPECIFIED DEEP VEINS OF LOWER EXTREMITY, BILATERAL: ICD-10-CM

## 2022-10-01 DIAGNOSIS — Z98.890 OTHER SPECIFIED POSTPROCEDURAL STATES: Chronic | ICD-10-CM

## 2022-10-01 DIAGNOSIS — Z90.710 ACQUIRED ABSENCE OF BOTH CERVIX AND UTERUS: Chronic | ICD-10-CM

## 2022-10-01 PROCEDURE — 93970 EXTREMITY STUDY: CPT | Mod: 26

## 2022-10-03 DIAGNOSIS — Z90.710 ACQUIRED ABSENCE OF BOTH CERVIX AND UTERUS: ICD-10-CM

## 2022-10-03 DIAGNOSIS — I10 ESSENTIAL (PRIMARY) HYPERTENSION: ICD-10-CM

## 2022-10-03 DIAGNOSIS — E66.9 OBESITY, UNSPECIFIED: ICD-10-CM

## 2022-10-03 DIAGNOSIS — E78.5 HYPERLIPIDEMIA, UNSPECIFIED: ICD-10-CM

## 2022-10-03 DIAGNOSIS — Z85.828 PERSONAL HISTORY OF OTHER MALIGNANT NEOPLASM OF SKIN: ICD-10-CM

## 2022-10-03 DIAGNOSIS — G47.33 OBSTRUCTIVE SLEEP APNEA (ADULT) (PEDIATRIC): ICD-10-CM

## 2022-10-03 DIAGNOSIS — F41.9 ANXIETY DISORDER, UNSPECIFIED: ICD-10-CM

## 2022-10-03 DIAGNOSIS — Z86.16 PERSONAL HISTORY OF COVID-19: ICD-10-CM

## 2022-10-03 DIAGNOSIS — Z87.891 PERSONAL HISTORY OF NICOTINE DEPENDENCE: ICD-10-CM

## 2022-10-03 DIAGNOSIS — J45.909 UNSPECIFIED ASTHMA, UNCOMPLICATED: ICD-10-CM

## 2022-10-03 DIAGNOSIS — Z79.01 LONG TERM (CURRENT) USE OF ANTICOAGULANTS: ICD-10-CM

## 2022-10-03 DIAGNOSIS — I48.91 UNSPECIFIED ATRIAL FIBRILLATION: ICD-10-CM

## 2022-10-03 DIAGNOSIS — M17.12 UNILATERAL PRIMARY OSTEOARTHRITIS, LEFT KNEE: ICD-10-CM

## 2022-10-03 DIAGNOSIS — F32.A DEPRESSION, UNSPECIFIED: ICD-10-CM

## 2022-10-03 DIAGNOSIS — K21.9 GASTRO-ESOPHAGEAL REFLUX DISEASE WITHOUT ESOPHAGITIS: ICD-10-CM

## 2022-10-03 DIAGNOSIS — Z91.041 RADIOGRAPHIC DYE ALLERGY STATUS: ICD-10-CM

## 2022-10-04 ENCOUNTER — APPOINTMENT (OUTPATIENT)
Dept: ORTHOPEDIC SURGERY | Facility: CLINIC | Age: 72
End: 2022-10-04

## 2022-10-07 NOTE — HISTORY OF PRESENT ILLNESS
[FreeTextEntry1] : Patient is a 72-year-old woman who is seen in follow-up evaluation status post previous catheter ablation for atrial fibrillation.  This was done December 2021.  Postoperatively she had a number of symptoms including increasing shortness of breath.  She had right hemidiaphragm partial paralysis.  This was felt to be related to COVID anesthesia and perhaps exacerbated by the procedure as well.  She was under the care of Dr. Solis and Dr. Mosqueda and she was briefly on a course of steroids.  Fortunately this is all resolved now her shortness of breath is completely resolved.\par \par Reviewed her medications today she is on flecainide 50 mg twice daily as well as metoprolol 25 mg twice daily.  She is also on anticoagulation with Eliquis 5 mg twice daily.\par \par Status post catheter ablation for atrial fibrillation December 2021.    She had pulmonary vein isolation as well as a cavotricuspid isthmus ablation.  She has not had any recurrence of palpitations or evidence of recurrent atrial fibrillation.  Patient is currently on flecainide 50 mg twice daily as well as Eliquis 5 mg twice daily.  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  The ablation procedure was reviewed and ablation was performed and a wide area away from phrenic nerve.  She had preceding COVID infection.  The patient had had shortness of breath after the ablation procedure and prior to the ablation procedure as well.  Her symptoms have been improving.  She is followed by pulmonary and repeat testing of the diaphragm is pending.  She also had evidence of pulmonary hypertension that is improved.\par \par \par There is no prior history of CAD/MI/heart failure, diabetes, TIA or stroke.  She has had a prior history of hypertension.

## 2022-10-07 NOTE — REVIEW OF SYSTEMS
[Weight Gain (___ Lbs)] : no recent weight gain [Feeling Fatigued] : not feeling fatigued [Blurry Vision] : no blurred vision [Sore Throat] : no sore throat [SOB] : no shortness of breath [Dyspnea on exertion] : not dyspnea during exertion [Chest Discomfort] : no chest discomfort [Palpitations] : no palpitations [Orthopnea] : no orthopnea [PND] : no PND [Syncope] : no syncope [Cough] : no cough [Abdominal Pain] : no abdominal pain [Nausea] : no nausea [Vomiting] : no vomiting [Heartburn] : no heartburn [Change in Appetite] : no change in appetite [Dysphagia] : no dysphagia [Diarrhea] : diarrhea [Dysuria] : no dysuria [Dizziness] : no dizziness [Under Stress] : not under stress [Easy Bleeding] : no tendency for easy bleeding

## 2022-10-07 NOTE — DISCUSSION/SUMMARY
[EKG obtained to assist in diagnosis and management of assessed problem(s)] : EKG obtained to assist in diagnosis and management of assessed problem(s) [FreeTextEntry1] : The patient remained in sinus rhythm post catheter ablation for A. fib.\par \par Her shortness of breath has improved and the right hemidiaphragm dysfunction has also improved.\par \par Her  [id number] score is low and we might be able to discontinue Eliquis within the next 3 months.  The patient is scheduled for knee surgery and we will continue current medications through her postoperative period and I will discuss with Dr. Stafford after his repeat testing timing for discontinuation of Eliquis.  My recommendation would be for her to continue the flecainide and low-dose beta-blocker for at least next 6 months.\par I will review her stress test and follow-up echocardiogram when available.  Previous echo has shown showed moderate pulm hypertension and we will review to see if this is resolved as well.\par \par From an arrhythmia perspective the patient is cleared to undergo her knee surgery.  Recommendation would be okay for her to continue both flecainide and metoprolol.  If she needs discontinued Eliquis prior to procedure this can be done prior to the procedure and resumed after procedure when deemed safe. Patient is doing well from atrial fibrillation standpoint without any evidence of recurrent A. fib.  We will continue her on her current medications for now including low-dose flecainide, metoprolol and anticoagulation.\par \par \par \par Recommend follow-up evaluation with electrophysiology in about 4 to 6 months.

## 2022-10-11 ENCOUNTER — RESULT CHARGE (OUTPATIENT)
Age: 72
End: 2022-10-11

## 2022-10-11 ENCOUNTER — APPOINTMENT (OUTPATIENT)
Dept: ORTHOPEDIC SURGERY | Facility: CLINIC | Age: 72
End: 2022-10-11

## 2022-10-11 VITALS — HEIGHT: 63 IN | BODY MASS INDEX: 29.77 KG/M2 | WEIGHT: 168 LBS

## 2022-10-11 DIAGNOSIS — M17.12 UNILATERAL PRIMARY OSTEOARTHRITIS, LEFT KNEE: ICD-10-CM

## 2022-10-11 PROCEDURE — 73562 X-RAY EXAM OF KNEE 3: CPT | Mod: LT

## 2022-10-11 PROCEDURE — 99213 OFFICE O/P EST LOW 20 MIN: CPT

## 2022-10-11 NOTE — ASSESSMENT
[FreeTextEntry1] : Previous doc:\par we discussed her options including mri cortisone and ha injections. discussed arthroscopy would likely not help her. could do therapy as well for her buckling on the right knee\par 2/22/21: Inj tolerated well.\par 3/1/21: Inj tolerated well.\par 3/30/21: with really good results on the right knee and poor results on left and continued large effusion recommend to get MRI of left knee to evaluate for meniscus tear.\par 4/13/21: MRI of the left knee showing medial meniscus root tear with some tricompartment OA. With less OA in the right knee but significantly more symptomatic then the right knee. Has minimal medial pain but significant posterior pain today, will like try one more injection. Had little relief from cortisone injection, will try Zilretta and PT before proceeding with surgical intervention.\par 7/30/21 -retrunof symptom s- likely more Meniscal than OA at this point but unsure - I will try asp inj again as she has some medical issues as well as helping her mom but if retruns likely suggest arthrhosocpy\par 11/19/21: Repeat zilretta left knee today tolerated well.\par 2/22/22: Progression to adv OA left knee - zilretta today tolerated well, asp 40cc. Discussed left TKA when she is ready. Too much damage for scope, has some ant knee pain as well.\par 5/17/22: Too soon for repeat zilretta.  Cortisone inj today tolerated well, will return next month after her vacation to discuss repeat zilretta vs TKA.\par 6/7/22 she has a lot happening with her mom so has to delay surgery but thinking in 4-5 months- she is still on eliquis so cannot take NSAIDs - we will try another zillretta inj today and plan for surgery in sept/oct -  \par 8/9/22: Mild/mod right hip OA; most likely aggravated by limping due to adv knee OA in bilateral knees. Discussed TKA, r/b/a, and preop/postop periods in detail. She is ready to proceed with L TKA. Will administer Zilretta right knee, tolerated well today. \par \par 10/11/22: 2 weeks s/p left TKA doing very well.  Cont PT.  Min pain, takes tramadol with PT.

## 2022-10-11 NOTE — HISTORY OF PRESENT ILLNESS
[] : Post Surgical Visit: yes [de-identified] : 10/11/22: 2 weeks s/p left TKA min pain, better than preop.  No fevers/chills.  Went to rehab x 1 week now home and starting outpatient PT tomorrow.\par \par Previous doc:\par 2/12/21- She is for evaluation chronic b/l knee pain\par The Left knee she has been told she had bone on bone OA cortisone no help, She completed series of 3 Ha injections not sure of the brand did help for more than one year. Over the last few months pain has been returning. Pain with start up, and down stairs.\par The right knee several month history of episodic laterally based right knee pain with prolonged standing.\par PMH: Afib (ellaquis) can not take nsaids\par htn, depression\par retired\par 2/22/21: Euflexxa #2 b/l knees.\par 3/1/21: Euflexxa #3 b/l knees.\par 3/30/21: 4 weeks s/p bilateral euflexxa injections. Right knee significantly improved but left knee continues to have significant pain.\par 4/13/21: Followed today for MRI of the left knee- with continued pain and symptoms.\par 7/30 - return of swelling and pain in the left knee no sig pain in rt knee\par 11/19/21: Left knee pain returned 2 weeks ago, no injury. Had good relief from zilretta until now.\par 5/17/22: Zilretta left knee helped a lot but pain returned 2 weeks ago.\par 6/7/22 more than 3 months from Zillretta - depo did no sig help -  overall knee pain is worse and feels unsafe and leg giving out on her  \par 8/9/22: Here for follow up of bilateral knees. She is also here with a new onset of right hip/groin pain for the past 2 weeks with no injury.  [de-identified] : 9/21/2022

## 2022-10-11 NOTE — PHYSICAL EXAM
[AP] : anteroposterior [Lateral] : lateral [[place]] : skyline [Components well fixed, in good position] : Components well fixed, in good position [de-identified] : Left knee: Inc clean/dry.  ROM 3-100.  Stable.  Walker.

## 2022-10-11 NOTE — DISCUSSION/SUMMARY
[de-identified] : The patient is doing well at this time. The patient will be started on a course of physical therapy. I recommended that the patient works on range of motion at home and was shown how to do this. I encouraged the patient to increase ambulation. The patient can continue to take Tylenol for occasional discomfort. The patient was advised not to do any dental work for the first three months following the surgery. We will see the patient  back for a follow-up for a repeat evaluation. The patient  will call or return earlier for any questions or concerns.  Signs and symptoms of infection reviewed and patient advised to call immediately for redness, fevers, and/or chills.\par

## 2022-10-19 ENCOUNTER — APPOINTMENT (OUTPATIENT)
Dept: ORTHOPEDIC SURGERY | Facility: HOSPITAL | Age: 72
End: 2022-10-19

## 2022-11-01 ENCOUNTER — APPOINTMENT (OUTPATIENT)
Dept: ORTHOPEDIC SURGERY | Facility: CLINIC | Age: 72
End: 2022-11-01

## 2022-11-08 ENCOUNTER — APPOINTMENT (OUTPATIENT)
Dept: ORTHOPEDIC SURGERY | Facility: CLINIC | Age: 72
End: 2022-11-08

## 2022-11-08 PROCEDURE — 73562 X-RAY EXAM OF KNEE 3: CPT | Mod: LT

## 2022-11-08 PROCEDURE — 99024 POSTOP FOLLOW-UP VISIT: CPT

## 2022-11-08 NOTE — ASSESSMENT
[FreeTextEntry1] : Previous doc:\par we discussed her options including mri cortisone and ha injections. discussed arthroscopy would likely not help her. could do therapy as well for her buckling on the right knee\par 2/22/21: Inj tolerated well.\par 3/1/21: Inj tolerated well.\par 3/30/21: with really good results on the right knee and poor results on left and continued large effusion recommend to get MRI of left knee to evaluate for meniscus tear.\par 4/13/21: MRI of the left knee showing medial meniscus root tear with some tricompartment OA. With less OA in the right knee but significantly more symptomatic then the right knee. Has minimal medial pain but significant posterior pain today, will like try one more injection. Had little relief from cortisone injection, will try Zilretta and PT before proceeding with surgical intervention.\par 7/30/21 -retrunof symptom s- likely more Meniscal than OA at this point but unsure - I will try asp inj again as she has some medical issues as well as helping her mom but if retruns likely suggest arthrhosocpy\par 11/19/21: Repeat zilretta left knee today tolerated well.\par 2/22/22: Progression to adv OA left knee - zilretta today tolerated well, asp 40cc. Discussed left TKA when she is ready. Too much damage for scope, has some ant knee pain as well.\par 5/17/22: Too soon for repeat zilretta.  Cortisone inj today tolerated well, will return next month after her vacation to discuss repeat zilretta vs TKA.\par 6/7/22 she has a lot happening with her mom so has to delay surgery but thinking in 4-5 months- she is still on eliquis so cannot take NSAIDs - we will try another zillretta inj today and plan for surgery in sept/oct -  \par 8/9/22: Mild/mod right hip OA; most likely aggravated by limping due to adv knee OA in bilateral knees. Discussed TKA, r/b/a, and preop/postop periods in detail. She is ready to proceed with L TKA. Will administer Zilretta right knee, tolerated well today. \par 10/11/22: 2 weeks s/p left TKA doing very well.  Cont PT.  Min pain, takes tramadol with PT.\par \par 11/8/22: 6wks postop L TKA. Diong well today and happy with progress.

## 2022-11-08 NOTE — HISTORY OF PRESENT ILLNESS
[] : Post Surgical Visit: yes [de-identified] : 11/8/22: 6 weeks s/p L TKA. She is gradually improving with PT. 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 Over the last few months pain has been returning. Pain with start up, and down stairs.\par The right knee several month history of episodic laterally based right knee pain with prolonged standing.\par PMH: Afib (ellaquis) can not take nsaids\par htn, depression\par retired\par 2/22/21: Euflexxa #2 b/l knees.\par 3/1/21: Euflexxa #3 b/l knees.\par 3/30/21: 4 weeks s/p bilateral euflexxa injections. Right knee significantly improved but left knee continues to have significant pain.\par 4/13/21: Followed today for MRI of the left knee- with continued pain and symptoms.\par 7/30 - return of swelling and pain in the left knee no sig pain in rt knee\par 11/19/21: Left knee pain returned 2 weeks ago, no injury. Had good relief from zilretta until now.\par 5/17/22: Zilretta left knee helped a lot but pain returned 2 weeks ago.\par 6/7/22 more than 3 months from Zillretta - depo did no sig help -  overall knee pain is worse and feels unsafe and leg giving out on her  \par 8/9/22: Here for follow up of bilateral knees. She is also here with a new onset of right hip/groin pain for the past 2 weeks with no injury. \par 10/11/22: 2 weeks s/p left TKA min pain, better than preop.  No fevers/chills.  Went to rehab x 1 week now home and starting outpatient PT tomorrow. [FreeTextEntry1] : left knee  [FreeTextEntry5] : JADEN is here today for left knee post op visit. pt states pain is better since last visit.  [de-identified] : 09/21/22 [de-identified] : L TKA

## 2022-11-08 NOTE — DISCUSSION/SUMMARY
[de-identified] : The patient is doing well at this time. The patient will continue on a course of physical therapy. I recommended that the patient works on range of motion at home and was shown how to do this. I encouraged the patient to increase ambulation. The patient can continue to take Tylenol for occasional discomfort. The patient was advised not to do any dental work for the first three months following the surgery. The patient will call or return earlier for any questions or concerns.  Signs and symptoms of infection reviewed and patient advised to call immediately for redness, fevers, and/or chills. \par \par Progress note completed by Chanelle Guillen PA-C.\par The documentation recorded by the PA accurately reflects the service I personally performed and the decisions made by me. -Dr. Blum

## 2022-11-08 NOTE — PHYSICAL EXAM
[de-identified] : LEFT KNEE\par Incision is clean and dry with no drainage - healing well \par Sensation intact\par Motor 5/5 \par +1 edema LE\par Stable\par ROM 0-112\par \par Xray 3 views Left knee - Implants good position and well fixed

## 2022-12-12 ENCOUNTER — APPOINTMENT (OUTPATIENT)
Dept: PULMONOLOGY | Facility: CLINIC | Age: 72
End: 2022-12-12

## 2022-12-13 ENCOUNTER — APPOINTMENT (OUTPATIENT)
Dept: ORTHOPEDIC SURGERY | Facility: CLINIC | Age: 72
End: 2022-12-13

## 2022-12-13 VITALS — WEIGHT: 170 LBS | HEIGHT: 63 IN | BODY MASS INDEX: 30.12 KG/M2

## 2022-12-13 DIAGNOSIS — Z96.652 PRESENCE OF LEFT ARTIFICIAL KNEE JOINT: ICD-10-CM

## 2022-12-13 PROCEDURE — 99024 POSTOP FOLLOW-UP VISIT: CPT

## 2022-12-13 NOTE — ASSESSMENT
[FreeTextEntry1] : Previous doc:\par we discussed her options including mri cortisone and ha injections. discussed arthroscopy would likely not help her. could do therapy as well for her buckling on the right knee\par 2/22/21: Inj tolerated well.\par 3/1/21: Inj tolerated well.\par 3/30/21: with really good results on the right knee and poor results on left and continued large effusion recommend to get MRI of left knee to evaluate for meniscus tear.\par 4/13/21: MRI of the left knee showing medial meniscus root tear with some tricompartment OA. With less OA in the right knee but significantly more symptomatic then the right knee. Has minimal medial pain but significant posterior pain today, will like try one more injection. Had little relief from cortisone injection, will try Zilretta and PT before proceeding with surgical intervention.\par 7/30/21 -retrunof symptom s- likely more Meniscal than OA at this point but unsure - I will try asp inj again as she has some medical issues as well as helping her mom but if retruns likely suggest arthrhosocpy\par 11/19/21: Repeat zilretta left knee today tolerated well.\par 2/22/22: Progression to adv OA left knee - zilretta today tolerated well, asp 40cc. Discussed left TKA when she is ready. Too much damage for scope, has some ant knee pain as well.\par 5/17/22: Too soon for repeat zilretta.  Cortisone inj today tolerated well, will return next month after her vacation to discuss repeat zilretta vs TKA.\par 6/7/22 she has a lot happening with her mom so has to delay surgery but thinking in 4-5 months- she is still on eliquis so cannot take NSAIDs - we will try another zillretta inj today and plan for surgery in sept/oct -  \par 8/9/22: Mild/mod right hip OA; most likely aggravated by limping due to adv knee OA in bilateral knees. Discussed TKA, r/b/a, and preop/postop periods in detail. She is ready to proceed with L TKA. Will administer Zilretta right knee, tolerated well today. \par 10/11/22: 2 weeks s/p left TKA doing very well.  Cont PT.  Min pain, takes tramadol with PT.\par 11/8/22: 6wks postop L TKA. Diong well today and happy with progress. \par \par 12/13/22: ~3mon postop L TKA. She is progressing well. PT rx renewed. Discussed IA CSI for the hip in the future if she wishes. tramadol prn. Follow up at 1 year anniversary.

## 2022-12-13 NOTE — IMAGING
[de-identified] : LEFT KNEE\par well healed incision \par Sensation intact\par Motor 5/5 \par Stable\par ROM 0-125\par

## 2022-12-13 NOTE — DISCUSSION/SUMMARY
[de-identified] : Progress note completed by JOSEPH GibsonC.\par The documentation recorded by the PA accurately reflects the service I personally performed and the decisions made by me. -Dr. Blum

## 2022-12-13 NOTE — HISTORY OF PRESENT ILLNESS
[3] : 3 [0] : 0 [Tightness] : tightness [Intermittent] : intermittent [Household chores] : household chores [Leisure] : leisure [Rest] : rest [Ice] : ice [Standing] : standing [de-identified] : 12/13/22: Nearly 3 months s/p L TKA. She is doing well overall and notes improvement. Occasional swelling with increased activity. 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 Over the last few months pain has been returning. Pain with start up, and down stairs.\par The right knee several month history of episodic laterally based right knee pain with prolonged standing.\par PMH: Afib (ellaquis) can not take nsaids\par htn, depression\par retired\par 2/22/21: Euflexxa #2 b/l knees.\par 3/1/21: Euflexxa #3 b/l knees.\par 3/30/21: 4 weeks s/p bilateral euflexxa injections. Right knee significantly improved but left knee continues to have significant pain.\par 4/13/21: Followed today for MRI of the left knee- with continued pain and symptoms.\par 7/30 - return of swelling and pain in the left knee no sig pain in rt knee\par 11/19/21: Left knee pain returned 2 weeks ago, no injury. Had good relief from zilretta until now.\par 5/17/22: Zilretta left knee helped a lot but pain returned 2 weeks ago.\par 6/7/22 more than 3 months from Zillretta - depo did no sig help -  overall knee pain is worse and feels unsafe and leg giving out on her  \par 8/9/22: Here for follow up of bilateral knees. She is also here with a new onset of right hip/groin pain for the past 2 weeks with no injury. \par 10/11/22: 2 weeks s/p left TKA min pain, better than preop.  No fevers/chills.  Went to rehab x 1 week now home and starting outpatient PT tomorrow.\par 11/8/22: 6 weeks s/p L TKA. She is gradually improving with PT.  [] : no [FreeTextEntry1] : Left knee [FreeTextEntry5] : JADEN ACE is a 72 year old F here for PO #3 for the left knee. Pt states that physical therapy is helping, and that she doesn't have pain, it is more of a discomfort.  [de-identified] : Standing still [de-identified] : 12/12/22 [de-identified] : 9/21/22 [de-identified] : Left total knee arthroplasty

## 2023-01-16 NOTE — ED PROVIDER NOTE - IV ALTEPLASE EXCL ABS HIDDEN
Take meds for symptom relief. Please follow-up with your primary care doctor within 1 to 3 days. Return back to the emergency department as needed. show

## 2023-01-25 ENCOUNTER — RX RENEWAL (OUTPATIENT)
Age: 73
End: 2023-01-25

## 2023-02-01 RX ORDER — FLECAINIDE ACETATE 50 MG/1
50 TABLET ORAL
Qty: 120 | Refills: 1 | Status: ACTIVE | COMMUNITY
Start: 2022-08-11 | End: 1900-01-01

## 2023-04-13 ENCOUNTER — RX RENEWAL (OUTPATIENT)
Age: 73
End: 2023-04-13

## 2023-09-05 NOTE — ED PROVIDER NOTE - CRTICAL CARE TIME SPENT (MIN)
- noted to have Hgb of 8.6 on admission   - appears stable based on prior labs  - no signs of bleeding noted   - likely in setting of ESRD  - monitor CBC daily for now 35

## 2023-09-26 ENCOUNTER — APPOINTMENT (OUTPATIENT)
Dept: ORTHOPEDIC SURGERY | Facility: CLINIC | Age: 73
End: 2023-09-26
Payer: MEDICARE

## 2023-09-26 VITALS — HEIGHT: 63 IN | WEIGHT: 170 LBS | BODY MASS INDEX: 30.12 KG/M2

## 2023-09-26 PROCEDURE — 73562 X-RAY EXAM OF KNEE 3: CPT | Mod: LT

## 2023-09-26 PROCEDURE — 99213 OFFICE O/P EST LOW 20 MIN: CPT

## 2023-09-26 RX ORDER — FLECAINIDE ACETATE 100 MG/1
100 TABLET ORAL
Refills: 0 | Status: ACTIVE | COMMUNITY

## 2023-11-24 RX ORDER — BUPROPION HYDROCHLORIDE 150 MG/1
1 TABLET, EXTENDED RELEASE ORAL
Qty: 0 | Refills: 0 | DISCHARGE

## 2023-11-24 RX ORDER — FLECAINIDE ACETATE 50 MG
1 TABLET ORAL
Qty: 0 | Refills: 0 | DISCHARGE

## 2023-11-24 RX ORDER — ROSUVASTATIN CALCIUM 5 MG/1
1 TABLET ORAL
Qty: 0 | Refills: 0 | DISCHARGE

## 2023-11-24 RX ORDER — EPINASTINE HYDROCHLORIDE 0.5 MG/ML
1 SOLUTION OPHTHALMIC
Qty: 0 | Refills: 0 | DISCHARGE

## 2023-11-24 RX ORDER — AZILSARTAN KAMEDOXOMIL 40 MG/1
1 TABLET ORAL
Qty: 0 | Refills: 0 | DISCHARGE

## 2023-11-24 RX ORDER — APIXABAN 2.5 MG/1
1 TABLET, FILM COATED ORAL
Qty: 0 | Refills: 0 | DISCHARGE

## 2023-11-24 RX ORDER — METOPROLOL TARTRATE 50 MG
1 TABLET ORAL
Qty: 0 | Refills: 0 | DISCHARGE

## 2024-02-28 NOTE — ED ADULT NURSE NOTE - TEMPLATE
AO x4. 2L at rest. RA during day. CPAP at night. L arm precautions for a L Mastectomy. Tele - NSR. Xarelto. Continent. Up standby to bathroom. Reports R knee pain, heat packs given and norco PRN. QID accuchecks. IVF infusing per MAR. Unasyn. Azithromycin. Carb controlled diet. Pt resting in bed with call light in reach. NO further needs at this moment.    Fall

## 2024-07-22 ENCOUNTER — NON-APPOINTMENT (OUTPATIENT)
Age: 74
End: 2024-07-22

## 2024-08-07 PROBLEM — I10 ESSENTIAL (PRIMARY) HYPERTENSION: Chronic | Status: ACTIVE | Noted: 2022-09-08

## 2024-08-07 PROBLEM — G47.30 SLEEP APNEA, UNSPECIFIED: Chronic | Status: ACTIVE | Noted: 2022-09-08

## 2024-08-07 PROBLEM — I48.91 UNSPECIFIED ATRIAL FIBRILLATION: Chronic | Status: ACTIVE | Noted: 2022-09-08

## 2024-08-07 PROBLEM — J45.909 UNSPECIFIED ASTHMA, UNCOMPLICATED: Chronic | Status: ACTIVE | Noted: 2022-09-08

## 2024-08-07 PROBLEM — E78.5 HYPERLIPIDEMIA, UNSPECIFIED: Chronic | Status: ACTIVE | Noted: 2022-09-08

## 2024-08-07 PROBLEM — C44.90 UNSPECIFIED MALIGNANT NEOPLASM OF SKIN, UNSPECIFIED: Chronic | Status: ACTIVE | Noted: 2022-09-08

## 2024-08-07 PROBLEM — I45.6 PRE-EXCITATION SYNDROME: Chronic | Status: ACTIVE | Noted: 2022-09-08

## 2024-08-09 ENCOUNTER — OUTPATIENT (OUTPATIENT)
Dept: OUTPATIENT SERVICES | Facility: HOSPITAL | Age: 74
LOS: 1 days | End: 2024-08-09
Payer: MEDICARE

## 2024-08-09 ENCOUNTER — APPOINTMENT (OUTPATIENT)
Dept: RADIOLOGY | Facility: IMAGING CENTER | Age: 74
End: 2024-08-09

## 2024-08-09 DIAGNOSIS — Z98.890 OTHER SPECIFIED POSTPROCEDURAL STATES: Chronic | ICD-10-CM

## 2024-08-09 DIAGNOSIS — H20.023 RECURRENT ACUTE IRIDOCYCLITIS, BILATERAL: ICD-10-CM

## 2024-08-09 DIAGNOSIS — Z90.710 ACQUIRED ABSENCE OF BOTH CERVIX AND UTERUS: Chronic | ICD-10-CM

## 2024-08-09 PROCEDURE — 71046 X-RAY EXAM CHEST 2 VIEWS: CPT

## 2024-08-09 PROCEDURE — 71046 X-RAY EXAM CHEST 2 VIEWS: CPT | Mod: 26

## 2025-04-02 ENCOUNTER — APPOINTMENT (OUTPATIENT)
Dept: ELECTROPHYSIOLOGY | Facility: CLINIC | Age: 75
End: 2025-04-02
Payer: MEDICARE

## 2025-04-02 VITALS
SYSTOLIC BLOOD PRESSURE: 116 MMHG | BODY MASS INDEX: 22.68 KG/M2 | OXYGEN SATURATION: 98 % | HEART RATE: 60 BPM | HEIGHT: 63 IN | DIASTOLIC BLOOD PRESSURE: 64 MMHG | WEIGHT: 128 LBS

## 2025-04-02 DIAGNOSIS — Z86.79 OTHER SPECIFIED POSTPROCEDURAL STATES: ICD-10-CM

## 2025-04-02 DIAGNOSIS — Z98.890 OTHER SPECIFIED POSTPROCEDURAL STATES: ICD-10-CM

## 2025-04-02 PROCEDURE — 99214 OFFICE O/P EST MOD 30 MIN: CPT

## 2025-04-02 PROCEDURE — 93000 ELECTROCARDIOGRAM COMPLETE: CPT

## 2025-04-02 RX ORDER — TIRZEPATIDE 2.5 MG/.5ML
2.5 INJECTION, SOLUTION SUBCUTANEOUS
Refills: 0 | Status: ACTIVE | COMMUNITY

## 2025-04-02 RX ORDER — BROMFENAC SODIUM 100 %
POWDER (GRAM) MISCELLANEOUS
Refills: 0 | Status: ACTIVE | COMMUNITY

## 2025-04-02 RX ORDER — METOPROLOL SUCCINATE 50 MG/1
50 TABLET, EXTENDED RELEASE ORAL
Qty: 180 | Refills: 1 | Status: ACTIVE | COMMUNITY
Start: 2025-04-02 | End: 1900-01-01